# Patient Record
Sex: MALE | Race: ASIAN | URBAN - METROPOLITAN AREA
[De-identification: names, ages, dates, MRNs, and addresses within clinical notes are randomized per-mention and may not be internally consistent; named-entity substitution may affect disease eponyms.]

---

## 2018-03-12 ENCOUNTER — OFFICE VISIT (OUTPATIENT)
Dept: OPHTHALMOLOGY | Facility: CLINIC | Age: 58
End: 2018-03-12
Attending: OPHTHALMOLOGY
Payer: COMMERCIAL

## 2018-03-12 DIAGNOSIS — E11.9 DIABETES MELLITUS WITHOUT COMPLICATION (H): ICD-10-CM

## 2018-03-12 DIAGNOSIS — H25.13 NUCLEAR SENILE CATARACT OF BOTH EYES: ICD-10-CM

## 2018-03-12 DIAGNOSIS — H40.053 BORDERLINE GLAUCOMA OF BOTH EYES WITH OCULAR HYPERTENSION: ICD-10-CM

## 2018-03-12 DIAGNOSIS — H11.009 PTERYGIUM: Primary | ICD-10-CM

## 2018-03-12 PROCEDURE — 92025 CPTRIZED CORNEAL TOPOGRAPHY: CPT | Mod: ZF | Performed by: OPHTHALMOLOGY

## 2018-03-12 PROCEDURE — G0463 HOSPITAL OUTPT CLINIC VISIT: HCPCS | Mod: 25

## 2018-03-12 PROCEDURE — 92133 CPTRZD OPH DX IMG PST SGM ON: CPT | Mod: ZF | Performed by: OPHTHALMOLOGY

## 2018-03-12 RX ORDER — TIMOLOL MALEATE 5 MG/ML
1 SOLUTION/ DROPS OPHTHALMIC DAILY
Qty: 1 BOTTLE | Refills: 11 | Status: SHIPPED | OUTPATIENT
Start: 2018-03-12 | End: 2019-08-28

## 2018-03-12 ASSESSMENT — TONOMETRY
OD_IOP_MMHG: 18
OS_IOP_MMHG: 27
IOP_METHOD: TONOPEN
OS_IOP_MMHG: 25
OD_IOP_MMHG: 22
IOP_METHOD: TONOPEN

## 2018-03-12 ASSESSMENT — PACHYMETRY
OS_CT(UM): 550
EXAM_DATE: 3/12/2018
OD_CT(UM): 540

## 2018-03-12 ASSESSMENT — CUP TO DISC RATIO
OS_RATIO: 0.5
OD_RATIO: 0.25

## 2018-03-12 ASSESSMENT — VISUAL ACUITY
OD_PH_SC: 20/30
OS_SC: 20/30
OD_PH_CC: -3
OD_SC: 20/50
METHOD: SNELLEN - LINEAR
OD_SC+: -2

## 2018-03-12 ASSESSMENT — EXTERNAL EXAM - LEFT EYE: OS_EXAM: NORMAL

## 2018-03-12 ASSESSMENT — CONF VISUAL FIELD
OS_NORMAL: 1
METHOD: COUNTING FINGERS
OD_NORMAL: 1

## 2018-03-12 ASSESSMENT — EXTERNAL EXAM - RIGHT EYE: OD_EXAM: NORMAL

## 2018-03-12 ASSESSMENT — SLIT LAMP EXAM - LIDS
COMMENTS: BLEPHARITIS
COMMENTS: BLEPHARITIS

## 2018-03-12 NOTE — MR AVS SNAPSHOT
After Visit Summary   3/12/2018    Felix Ge    MRN: [id number]           Patient Information     Date Of Birth          1960        Visit Information        Provider Department      3/12/2018 7:30 AM G[facility]        Today's Diagnoses     Pterygium    -  1    Nuclear senile cataract of both eyes        Borderline glaucoma of both eyes with ocular hypertension        Diabetes mellitus without complication (H)           Follow-ups after your visit        Future tests that were ordered for you today     Open Future Orders        Priority Expected Expires Ordered    Pachymetry OU (both eyes) Routine  2019 3/12/2018            Who to contact     Please call your clinic at [phone] to:    Ask questions about your health    Make or cancel appointments    Discuss your medicines    Learn about your test results    Speak to your doctor            Additional Information About Your Visit        MyChart Information     [employer] is an electronic gateway that provides easy, online access to your medical records. With [employer], you can request a clinic appointment, read your test results, renew a prescription or communicate with your care team.     To sign up for [employer] visit the website at [email or web address]   You will be asked to enter the access code listed below, as well as some personal information. Please follow the directions to create your username and password.     Your access code is: [email or web address]  Expires: 2018  7:30 AM     Your access code will  in 90 days. If you need help or a new code, please contact your [facility] or call [phone] for assistance.        Care EveryWhere ID     This is your Care EveryWhere ID. This could be used by other organizations to access your [facility] medical records  [id number]         Blood Pressure from Last 3 Encounters:   No data found for BP    Weight from Last 3 Encounters:    No data found for Wt              We Performed the Following     Corneal Topography OU (both eyes)     OCT Optic Nerve RNFL Spectralis OU (both eyes)          Today's Medication Changes          These changes are accurate as of 3/12/18  9:01 AM.  If you have any questions, ask your nurse or doctor.               Start taking these medicines.        Dose/Directions    timolol 0.5 % ophthalmic solution   Commonly known as:  TIMOPTIC   Used for:  Borderline glaucoma of both eyes with ocular hypertension   Started by:  Guru William MD        Dose:  1 drop   Place 1 drop Into the left eye daily   Quantity:  1 Bottle   Refills:  11            Where to get your medicines      These medications were sent to [facility]/pharmacy #[id number] - [place], [place] - [address]  [address], [place] [address]     Phone:  [phone]     timolol 0.5 % ophthalmic solution                Primary Care Provider    None Specified       No primary provider on file.        Equal Access to Services     [facility] : Luna Weiner, tayler crowley, sam finleyalyolanda bernardo, remington mathias . So [facility] [phone].    ATENCIÓN: Si habla español, tiene a egan disposición servicios gratuitos de asistencia lingüística. Llame al [phone].    We comply with applicable federal civil rights laws and [place] laws. We do not discriminate on the basis of race, color, national origin, age, disability, sex, sexual orientation, or gender identity.            Thank you!     Thank you for choosing EYE CLINIC  for your care. Our goal is always to provide you with excellent care. Hearing back from our patients is one way we can continue to improve our services. Please take a few minutes to complete the written survey that you may receive in the mail after your visit with us. Thank you!             Your Updated Medication List - Protect others around you: Learn how to  safely use, store and throw away your medicines at www.disposemymeds.org.          This list is accurate as of 3/12/18  9:01 AM.  Always use your most recent med list.                   Brand Name Dispense Instructions for use Diagnosis    metFORMIN 500 MG tablet    GLUCOPHAGE     Take 500 mg by mouth 2 times daily (with meals)        timolol 0.5 % ophthalmic solution    TIMOPTIC    1 Bottle    Place 1 drop Into the left eye daily    Borderline glaucoma of both eyes with ocular hypertension

## 2018-03-12 NOTE — NURSING NOTE
Chief Complaints and History of Present Illnesses   Patient presents with     Pterygium Evaluation     Both eyes     HPI    Affected eye(s):  Both   Symptoms:     No floaters   No flashes   No redness   No Dryness   No itching         Do you have eye pain now?:  No      Comments:  Pt here for Pterygium eval both eyes. Pt states that he has had pterygiums in both eyes for many years. No changes in appearance, but pt would like them checked again.  DM2 BS: Pt doesn't check sugars daily.  A1C: 6.7 taken 2-3 months ago per pt.  No results found for: A1C    Sharon Rahman [facility] March 12, 2018 7:34 AM

## 2018-03-12 NOTE — PROGRESS NOTES
CC: referred by Dr. Bassett for pterygium OU    HPI: 58yo  M, smoker, reports 30 year history of white spots at nasal cornea both eyes.  He grew up in [place] and was told many years ago to leave them until they affected vision.  He does not report redness, irritation, burning, or pain.  His vision is slightly more blurry both eyes, wearing only readers.  He denies new flashes, floaters, or curtains in vision. He is interested in having them removed because they seem to be growing. He has a son with glaucoma.     A1c 6.7    OcHx:  Pterygia both eyes x 30 years    Gtts: none    1. Pterygia OD > OS  - Causing irregular astigmatism  - Patient has noticed growth  - Would recommend excision OD with conjunctival autograft OD  - Risks/benefits/alternatives discussed  - Patient wishes to proceed with surgery    2. DMII  - No retinopathy  - Tight BG control, BP control, healthy diet, exercise    3. Cataracts both eyes  - Not visually significant  - Observe    4. Blepharitis  - Lid hygiene    5. Ocular hypertension  - anomalous, tilted nerve  - son with glaucoma  - check pachy OU - shows average K thickness  - check baseline RNFL OU - shows inferior thinning OS  - will check baseline HVF after surgery  - start timolol daily qAM      Chino Morfin, DO  Cornea Fellow    ~~~~~~~~~~~~~~~~~~~~~~~~~~~~~~~~~~~~~~~~~~~~~~~~~~~~~~~~~~~~~~~~    Complete documentation of historical and exam elements from today's encounter can be found in the full encounter summary report (not reduplicated in this progress note). I personally obtained the chief complaint(s) and history of present illness.  I confirmed and edited as necessary the review of systems, past medical/surgical history, family history, social history, and examination findings as documented by others; and I examined the patient myself. I personally reviewed the relevant tests, images, and reports as documented above. I formulated and edited as necessary the assessment and  plan and discussed the findings and management plan with the patient and family.    I personally viewed the [imaging] and I agree with the interpretation as documented by the resident/fellow and edited by me as appropriate.    Guru William MD        --------------------------------------------------------------------------------------------------------------------------------------------  Pachymetry - Interpretation & Report  Indication: glaucoma suspect OU  Performed by: Alejandra aBr  Reliability: good  Patient cooperation: good  Findings:   Right eye:  540 micrometers centrally    Left eye:  550 micrometers centrally   Interval Change, Assessment, & Impact on treatment:   Right eye:  Baseline, average pachy   Left eye:  Baseline, slightly thicker pachy   Signed: Guru William 3/12/2018 8:47 AM

## 2018-03-12 NOTE — LETTER
3/12/2018      RE: Felix Ge  [address]  [place] [address]       CC: referred by Dr. Bassett for pterygium OU    HPI: 58yo  M, smoker, reports 30 year history of white spots at nasal cornea both eyes.  He grew up [place] and was told many years ago to leave them until they affected vision.  He does not report redness, irritation, burning, or pain.  His vision is slightly more blurry both eyes, wearing only readers.  He denies new flashes, floaters, or curtains in vision. He is interested in having them removed because they seem to be growing. He has a son with glaucoma.     A1c 6.7    OcHx:  Pterygia both eyes x 30 years    Gtts: none    1. Pterygia OD > OS  - Causing irregular astigmatism  - Patient has noticed growth  - Would recommend excision OD with conjunctival autograft OD  - Risks/benefits/alternatives discussed  - Patient wishes to proceed with surgery    2. DMII  - No retinopathy  - Tight BG control, BP control, healthy diet, exercise    3. Cataracts both eyes  - Not visually significant  - Observe    4. Blepharitis  - Lid hygiene    5. Ocular hypertension  - anomalous, tilted nerve  - son with glaucoma  - check pachy OU - shows average K thickness  - check baseline RNFL OU - shows inferior thinning OS  - will check baseline HVF after surgery  - start timolol daily qAM      Chino Morfin, DO  Cornea Fellow    ~~~~~~~~~~~~~~~~~~~~~~~~~~~~~~~~~~~~~~~~~~~~~~~~~~~~~~~~~~~~~~~~    Complete documentation of historical and exam elements from today's encounter can be found in the full encounter summary report (not reduplicated in this progress note). I personally obtained the chief complaint(s) and history of present illness.  I confirmed and edited as necessary the review of systems, past medical/surgical history, family history, social history, and examination findings as documented by others; and I examined the patient myself. I personally reviewed the relevant tests, images, and  reports as documented above. I formulated and edited as necessary the assessment and plan and discussed the findings and management plan with the patient and family.    I personally viewed the [imaging] and I agree with the interpretation as documented by the resident/fellow and edited by me as appropriate.    Guru William MD        --------------------------------------------------------------------------------------------------------------------------------------------  Pachymetry - Interpretation & Report  Indication: glaucoma suspect OU  Performed by: Alejandra Bar  Reliability: good  Patient cooperation: good  Findings:   Right eye:  540 micrometers centrally    Left eye:  550 micrometers centrally   Interval Change, Assessment, & Impact on treatment:   Right eye:  Baseline, average pachy   Left eye:  Baseline, slightly thicker pachy   Signed: Guru William 3/12/2018 8:47 AM          Guru William MD

## 2018-03-12 NOTE — LETTER
3/12/2018       RE: Felix Ge  [address]  [place] [address]     Dear Colleague,    Thank you for referring your patient, Felix Ge, to the EYE CLINIC at [facility]. Please see a copy of my visit note below.    CC: referred by Dr. Bassett for pterygium OU    HPI: 58yo  M, smoker, reports 30 year history of white spots at nasal cornea both eyes.  He grew up [place] and was told many years ago to leave them until they affected vision.  He does not report redness, irritation, burning, or pain.  His vision is slightly more blurry both eyes, wearing only readers.  He denies new flashes, floaters, or curtains in vision. He is interested in having them removed because they seem to be growing. He has a son with glaucoma.     A1c 6.7    OcHx:  Pterygia both eyes x 30 years    Gtts: none    1. Pterygia OD > OS  - Causing irregular astigmatism  - Patient has noticed growth  - Would recommend excision OD with conjunctival autograft OD  - Risks/benefits/alternatives discussed  - Patient wishes to proceed with surgery    2. DMII  - No retinopathy  - Tight BG control, BP control, healthy diet, exercise    3. Cataracts both eyes  - Not visually significant  - Observe    4. Blepharitis  - Lid hygiene    5. Ocular hypertension  - anomalous, tilted nerve  - son with glaucoma  - check pachy OU  - check baseline RNFL OU  - will check baseline HVF after surgery    ~~~~~~~~~~~~~~~~~~~~~~~~~~~~~~~~~~~~~~~~~~~~~~~~~~~~~~~~~~~~~~~~  --------------------------------------------------------------------------------------------------------------------------------------------  Pachymetry - Interpretation & Report  Indication: ***  Performed by: ***  Reliability: good  Patient cooperation: good  Findings:   Right eye:  *** micrometers centrally (*** standard deviation)   Left eye:  *** micrometers centrally (*** standard deviation)  Interval Change, Assessment, & Impact on  treatment:   Right eye:  ***   Left eye:  ***   Signed: Guru William 3/12/2018 8:47 AM          Again, thank you for allowing me to participate in the care of your patient.      Sincerely,    Guru William MD

## 2019-06-04 DIAGNOSIS — H11.009 PTERYGIUM, UNSPECIFIED LATERALITY: Primary | ICD-10-CM

## 2019-06-05 ENCOUNTER — OFFICE VISIT (OUTPATIENT)
Dept: OPHTHALMOLOGY | Facility: CLINIC | Age: 59
End: 2019-06-05
Attending: OPHTHALMOLOGY
Payer: COMMERCIAL

## 2019-06-05 ENCOUNTER — TELEPHONE (OUTPATIENT)
Dept: OPHTHALMOLOGY | Facility: CLINIC | Age: 59
End: 2019-06-05

## 2019-06-05 DIAGNOSIS — H11.009 PTERYGIUM, UNSPECIFIED LATERALITY: ICD-10-CM

## 2019-06-05 DIAGNOSIS — H40.053 BORDERLINE GLAUCOMA OF BOTH EYES WITH OCULAR HYPERTENSION: Primary | ICD-10-CM

## 2019-06-05 PROCEDURE — 92025 CPTRIZED CORNEAL TOPOGRAPHY: CPT | Mod: ZF | Performed by: OPHTHALMOLOGY

## 2019-06-05 PROCEDURE — G0463 HOSPITAL OUTPT CLINIC VISIT: HCPCS | Mod: 25

## 2019-06-05 RX ORDER — TIMOLOL MALEATE 5 MG/ML
1 SOLUTION/ DROPS OPHTHALMIC DAILY
Qty: 5 ML | Refills: 11 | Status: SHIPPED | OUTPATIENT
Start: 2019-06-05 | End: 2021-11-29

## 2019-06-05 ASSESSMENT — CUP TO DISC RATIO
OS_RATIO: 0.5
OD_RATIO: 0.25

## 2019-06-05 ASSESSMENT — TONOMETRY
IOP_METHOD: TONOPEN
OD_IOP_MMHG: 22
OS_IOP_MMHG: 33

## 2019-06-05 ASSESSMENT — VISUAL ACUITY
OS_PH_SC: 20/25
METHOD: SNELLEN - LINEAR
OD_PH_SC: 20/20
OS_SC: 20/40
OD_SC: 20/40

## 2019-06-05 ASSESSMENT — CONF VISUAL FIELD
OS_NORMAL: 1
OD_NORMAL: 1

## 2019-06-05 ASSESSMENT — EXTERNAL EXAM - LEFT EYE: OS_EXAM: NORMAL

## 2019-06-05 ASSESSMENT — EXTERNAL EXAM - RIGHT EYE: OD_EXAM: NORMAL

## 2019-06-05 ASSESSMENT — SLIT LAMP EXAM - LIDS
COMMENTS: BLEPHARITIS
COMMENTS: BLEPHARITIS

## 2019-06-05 NOTE — TELEPHONE ENCOUNTER
Patient is scheduled for surgery with Dr. William      Spoke or left message with: patient    Date of Surgery: 7/30/19    [place] [facility]    Informed patient they will need an adult  Yes    Pre-op with surgeon (if applicable): JUNG    H&P: Scheduled with Dr. Lidia COLBERT Baldwin patient will call and schedule appointment.    Additional imaging/appointments: post op appointments scheduled.    Surgery packet: gave to patient 6/5/19    Additional comments: Advised RN will call 1 - 3 days prior with arrival time and instructions.

## 2019-06-05 NOTE — PROGRESS NOTES
CC: referred by Dr. Bassett for pterygium OU    HPI: 57yo  M, smoker, reports 30 year history of white spots at nasal cornea both eyes.  He grew up [place] and was told many years ago to leave them until they affected vision. Patient was previously seen and wanted to scheduled surgery for pterygium, but was not called back for scheduling.     Interval Hx: He does not report redness, irritation, burning, or pain.  His vision is slightly more blurry both eyes, wearing only readers.  He denies new flashes, floaters, or curtains in vision. He is interested in having them removed because they seem to be growing. He has a son with glaucoma.       OcHx:  Pterygia both eyes x 30 years    Gtts: none    1. Pterygia OD > OS  - Causing irregular astigmatism  - Patient has noticed growth  - Would recommend excision OD with conjunctival autograft OD  - Risks/benefits/alternatives discussed  - Patient wishes to proceed with surgery  - K lupe today with persistent irregular astigmatism - would defer combined pterygium/cataract surgery.    2. DMII  - No retinopathy  - Tight BG control, BP control, healthy diet, exercise    3. Cataracts both eyes  - Borderline  - discussed possible combined pterygium/CE/IOL OD - will proceed with pterygium excision only    4. Blepharitis  - Lid hygiene    5. Ocular hypertension  - anomalous, tilted nerve  - son with glaucoma  - check pachy OU - shows average K thickness  - will check baseline HVF after surgery  - start timolol daily qAM each eye - stressed compliance    Attending Physician Attestation:  Complete documentation of historical and exam elements from today's encounter can be found in the full encounter summary report (not reduplicated in this progress note).  I personally obtained the chief complaint(s) and history of present illness.  I confirmed and edited as necessary the review of systems, past medical/surgical history, family history, social history, and examination findings  as documented by others; and I examined the patient myself.  I personally reviewed the relevant tests, images, and reports as documented above.  I formulated and edited as necessary the assessment and plan and discussed the findings and management plan with the patient and family. - Guru William MD

## 2019-07-26 ENCOUNTER — TELEPHONE (OUTPATIENT)
Dept: OPHTHALMOLOGY | Facility: CLINIC | Age: 59
End: 2019-07-26

## 2019-07-26 NOTE — TELEPHONE ENCOUNTER
Returned patient's call regarding  procedure with Dr. William on 7/30/19.      Advised RN tried to call yesterday and left voicemail. Advised RN will call back with arrival time and instructions.      Advised to call me with my direct line [phone] if any other questions.

## 2019-07-26 NOTE — TELEPHONE ENCOUNTER
[facility]    Phone Message    May a detailed message be left on voicemail: yes    Reason for Call: Other: Felix would like to hear from someone today if possible regarding his procedure instructions for surgery with Dr. William on Tuesday 7.30.19.      Action Taken: Message routed to:  [facility] ([facility] ump eye

## 2019-07-29 ENCOUNTER — ANESTHESIA EVENT (OUTPATIENT)
Dept: SURGERY | Facility: AMBULATORY SURGERY CENTER | Age: 59
End: 2019-07-29

## 2019-07-30 ENCOUNTER — HOSPITAL ENCOUNTER (OUTPATIENT)
Facility: AMBULATORY SURGERY CENTER | Age: 59
End: 2019-07-30
Attending: OPHTHALMOLOGY
Payer: COMMERCIAL

## 2019-07-30 ENCOUNTER — ANESTHESIA (OUTPATIENT)
Dept: SURGERY | Facility: AMBULATORY SURGERY CENTER | Age: 59
End: 2019-07-30

## 2019-07-30 VITALS
BODY MASS INDEX: 21.97 KG/M2 | DIASTOLIC BLOOD PRESSURE: 77 MMHG | TEMPERATURE: 98.1 F | HEIGHT: 67 IN | HEART RATE: 65 BPM | RESPIRATION RATE: 16 BRPM | SYSTOLIC BLOOD PRESSURE: 117 MMHG | WEIGHT: 140 LBS | OXYGEN SATURATION: 99 %

## 2019-07-30 DIAGNOSIS — H11.001 PTERYGIUM EYE, RIGHT: Primary | ICD-10-CM

## 2019-07-30 DIAGNOSIS — Z98.890 POSTOPERATIVE EYE STATE: ICD-10-CM

## 2019-07-30 LAB — GLUCOSE BLDC GLUCOMTR-MCNC: 123 MG/DL (ref 70–99)

## 2019-07-30 DEVICE — EYE IMP AMNIOTIC MEMBRANE 2.0X1.5CM AG-2015: Type: IMPLANTABLE DEVICE | Site: EYE | Status: FUNCTIONAL

## 2019-07-30 RX ORDER — ONDANSETRON 4 MG/1
4 TABLET, ORALLY DISINTEGRATING ORAL EVERY 30 MIN PRN
Status: DISCONTINUED | OUTPATIENT
Start: 2019-07-30 | End: 2019-07-31 | Stop reason: HOSPADM

## 2019-07-30 RX ORDER — EPINEPHRINE 1 MG/ML
INJECTION, SOLUTION, CONCENTRATE INTRAVENOUS PRN
Status: DISCONTINUED | OUTPATIENT
Start: 2019-07-30 | End: 2019-07-30 | Stop reason: HOSPADM

## 2019-07-30 RX ORDER — NALOXONE HYDROCHLORIDE 0.4 MG/ML
.1-.4 INJECTION, SOLUTION INTRAMUSCULAR; INTRAVENOUS; SUBCUTANEOUS
Status: DISCONTINUED | OUTPATIENT
Start: 2019-07-30 | End: 2019-07-31 | Stop reason: HOSPADM

## 2019-07-30 RX ORDER — LIDOCAINE HYDROCHLORIDE 20 MG/ML
INJECTION, SOLUTION INFILTRATION; PERINEURAL PRN
Status: DISCONTINUED | OUTPATIENT
Start: 2019-07-30 | End: 2019-07-30

## 2019-07-30 RX ORDER — FIBRINOGEN HUMAN, HUMAN THROMBIN 2 ML
KIT TOPICAL PRN
Status: DISCONTINUED | OUTPATIENT
Start: 2019-07-30 | End: 2019-07-30 | Stop reason: HOSPADM

## 2019-07-30 RX ORDER — LIDOCAINE 40 MG/G
CREAM TOPICAL
Status: DISCONTINUED | OUTPATIENT
Start: 2019-07-30 | End: 2019-07-30 | Stop reason: HOSPADM

## 2019-07-30 RX ORDER — PROPOFOL 10 MG/ML
INJECTION, EMULSION INTRAVENOUS PRN
Status: DISCONTINUED | OUTPATIENT
Start: 2019-07-30 | End: 2019-07-30

## 2019-07-30 RX ORDER — LIDOCAINE HYDROCHLORIDE AND EPINEPHRINE 10; 10 MG/ML; UG/ML
INJECTION, SOLUTION INFILTRATION; PERINEURAL PRN
Status: DISCONTINUED | OUTPATIENT
Start: 2019-07-30 | End: 2019-07-30 | Stop reason: HOSPADM

## 2019-07-30 RX ORDER — SODIUM CHLORIDE, SODIUM LACTATE, POTASSIUM CHLORIDE, CALCIUM CHLORIDE 600; 310; 30; 20 MG/100ML; MG/100ML; MG/100ML; MG/100ML
INJECTION, SOLUTION INTRAVENOUS CONTINUOUS
Status: DISCONTINUED | OUTPATIENT
Start: 2019-07-30 | End: 2019-07-31 | Stop reason: HOSPADM

## 2019-07-30 RX ORDER — ONDANSETRON 2 MG/ML
4 INJECTION INTRAMUSCULAR; INTRAVENOUS EVERY 30 MIN PRN
Status: DISCONTINUED | OUTPATIENT
Start: 2019-07-30 | End: 2019-07-31 | Stop reason: HOSPADM

## 2019-07-30 RX ORDER — SODIUM CHLORIDE, SODIUM LACTATE, POTASSIUM CHLORIDE, CALCIUM CHLORIDE 600; 310; 30; 20 MG/100ML; MG/100ML; MG/100ML; MG/100ML
INJECTION, SOLUTION INTRAVENOUS CONTINUOUS
Status: DISCONTINUED | OUTPATIENT
Start: 2019-07-30 | End: 2019-07-30 | Stop reason: HOSPADM

## 2019-07-30 RX ORDER — BALANCED SALT SOLUTION 6.4; .75; .48; .3; 3.9; 1.7 MG/ML; MG/ML; MG/ML; MG/ML; MG/ML; MG/ML
SOLUTION OPHTHALMIC PRN
Status: DISCONTINUED | OUTPATIENT
Start: 2019-07-30 | End: 2019-07-30 | Stop reason: HOSPADM

## 2019-07-30 RX ORDER — DEXAMETHASONE SODIUM PHOSPHATE 4 MG/ML
INJECTION, SOLUTION INTRA-ARTICULAR; INTRALESIONAL; INTRAMUSCULAR; INTRAVENOUS; SOFT TISSUE PRN
Status: DISCONTINUED | OUTPATIENT
Start: 2019-07-30 | End: 2019-07-30 | Stop reason: HOSPADM

## 2019-07-30 RX ORDER — OFLOXACIN 3 MG/ML
1 SOLUTION/ DROPS OPHTHALMIC 4 TIMES DAILY
Qty: 5 ML | Refills: 0 | Status: SHIPPED | OUTPATIENT
Start: 2019-07-30 | End: 2019-08-28

## 2019-07-30 RX ORDER — PREDNISOLONE ACETATE 10 MG/ML
1 SUSPENSION/ DROPS OPHTHALMIC 4 TIMES DAILY
Qty: 5 ML | Refills: 0 | Status: SHIPPED | OUTPATIENT
Start: 2019-07-30 | End: 2019-08-28

## 2019-07-30 RX ORDER — ACETAMINOPHEN 325 MG/1
975 TABLET ORAL ONCE
Status: COMPLETED | OUTPATIENT
Start: 2019-07-30 | End: 2019-07-30

## 2019-07-30 RX ORDER — MEPERIDINE HYDROCHLORIDE 25 MG/ML
12.5 INJECTION INTRAMUSCULAR; INTRAVENOUS; SUBCUTANEOUS
Status: DISCONTINUED | OUTPATIENT
Start: 2019-07-30 | End: 2019-07-31 | Stop reason: HOSPADM

## 2019-07-30 RX ORDER — MOXIFLOXACIN 5 MG/ML
1 SOLUTION/ DROPS OPHTHALMIC
Status: COMPLETED | OUTPATIENT
Start: 2019-07-30 | End: 2019-07-30

## 2019-07-30 RX ADMIN — LIDOCAINE HYDROCHLORIDE 40 MG: 20 INJECTION, SOLUTION INFILTRATION; PERINEURAL at 14:48

## 2019-07-30 RX ADMIN — PROPOFOL 50 MG: 10 INJECTION, EMULSION INTRAVENOUS at 14:49

## 2019-07-30 RX ADMIN — ACETAMINOPHEN 975 MG: 325 TABLET ORAL at 12:00

## 2019-07-30 RX ADMIN — MOXIFLOXACIN 1 DROP: 5 SOLUTION/ DROPS OPHTHALMIC at 12:05

## 2019-07-30 RX ADMIN — MOXIFLOXACIN 1 DROP: 5 SOLUTION/ DROPS OPHTHALMIC at 12:00

## 2019-07-30 RX ADMIN — SODIUM CHLORIDE, SODIUM LACTATE, POTASSIUM CHLORIDE, CALCIUM CHLORIDE: 600; 310; 30; 20 INJECTION, SOLUTION INTRAVENOUS at 14:44

## 2019-07-30 RX ADMIN — MOXIFLOXACIN 1 DROP: 5 SOLUTION/ DROPS OPHTHALMIC at 12:10

## 2019-07-30 ASSESSMENT — LIFESTYLE VARIABLES: TOBACCO_USE: 1

## 2019-07-30 ASSESSMENT — MIFFLIN-ST. JEOR: SCORE: 1403.67

## 2019-07-30 NOTE — ANESTHESIA POSTPROCEDURE EVALUATION
Anesthesia POST Procedure Evaluation    Patient: Felix Ge   MRN:     [id number] Gender:   male   Age:    59 year old :      1960        Preoperative Diagnosis: Pterygium, Right Eye   Procedure(s):  Right Eye Pterygium Excision with Conjunctival Autograft, Amniograft membrane   Postop Comments: No value filed.       Anesthesia Type:  Not documented  MAC    Reportable Event: NO     PAIN: Uncomplicated   Sign Out status: Comfortable, Well controlled pain     PONV: No PONV   Sign Out status:  No Nausea or Vomiting     Neuro/Psych: Uneventful perioperative course   Sign Out Status: Preoperative baseline; Age appropriate mentation     Airway/Resp.: Uneventful perioperative course   Sign Out Status: Non labored breathing, age appropriate RR; Resp. Status within EXPECTED Parameters     CV: Uneventful perioperative course   Sign Out status: Appropriate BP and perfusion indices; Appropriate HR/Rhythm     Disposition:   Sign Out in:  PACU  Disposition:  Phase II; Home  Recovery Course: Uneventful  Follow-Up: Not required           Last Anesthesia Record Vitals:  CRNA VITALS  2019 1514 - 2019 1614      2019             Resp Rate (observed):  16          Last PACU Vitals:  Vitals Value Taken Time   /83 2019  3:50 PM   Temp 36.7  C (98.1  F) 2019  3:50 PM   Pulse 70 2019  3:50 PM   Resp 16 2019  3:50 PM   SpO2 100 % 2019  3:50 PM   Temp src Available 2019  3:44 PM   NIBP 116/79 2019  3:40 PM   Pulse 67 2019  3:44 PM   SpO2 100 % 2019  3:44 PM   Resp     Temp     Ht Rate 66 2019  3:44 PM   Temp 2           Electronically Signed By: Johnie Blanc MD, MD, 2019, 4:42 PM

## 2019-07-30 NOTE — OP NOTE
Opthalmology Op Note    DATE OF OPERATION: 7/30/2019   PREOPERATIVE DIAGNOSIS: Pterygium, Left eye  POSTOPERATIVE DIAGNOSIS: Same  OPERATION PERFORMED:   1. Pterygium excision, left eye  2. Conjunctival autograft, left eye (1.2cm x 0.6cm)  3. Amniotic membrane transplantation, Left eye (1.5cm x 1.5cm)  ATTENDING: Guru William MD  FELLOW: Rao Aguillon DO  RESIDENT: Domenic Alfaro MD  FINDINGS: None  SPECIMEN: None  COMPLICATIONS: None  BLOOD LOSS: < 5.0 mL  Anesthesia: MAC/RBB    OPERATIVE INDICATIONS: The patient suffers from a pterygium of the Left eye.   After discussing the option of pterygium surgery including the risks and   benefits the patient voiced understanding and elected to proceed today.    DESCRIPTION OF PROCEDURE: The patient was identified in the preoperative   holding area where the operative eye was marked. The patient was then brought to the   operating room where a time out was called identifying the patient, the   procedure, and the correct site. A retrobulbar block consisting of Marcaine,   lidocaine, and Wydase was applied to the operative eye. Tetracaine drops were   applied to both eyes. The operative eye was prepped and draped in the usual sterile   ophthalmic fashion. An eyelid speculum was placed in the operative eye.     A surgical marker was used to dilineate the pterygium approximately 2.0 mm from the corneal limbus. Subconjunctival injection of lidocaine and phenylephrine was administered near the area of the pterygium.  Starting centrally, a 0.12 forceps was used to lift the pterygium off the cornea. Sharp Do scissors were used to trim the pterygium along the previous chase. The edges of the conjunctiva were underminded and approx 1-2 mm of underlying Tenon's was excised around the full border of the defect.   The bare scleral wound was again measured and surgical marker was used to delineate a superior conjunctival autograft. Subconjunctival injection of lidocaine and  phenylephrine was administered under the area of the autograft which was then bluntly dissected with sharp Do sissors and trimmed to the previous marked size. Tissue glue was used to adhere conjunctival autograft to the remaining defect between the conjunctiva and limbus nasally.   Cryopreserved amniotic membrane measuring 1.5x1.5 was cut and adhered with Tissel glue over the cornea, limbus and conjunctival autograft. Another piece of cryopreserved amniotic membrane measuring approximately 1 mm larger than the defect was cut to size and adhered with Tissel glue. Excess glue was removed from the edges of the amniotic membrane and kontour lens was placed.    Subconjunctival injections of dexamethasone and Ancef were given. The eyelid speculum was removed. Maxitrol ointment was placed into the eye.  A patch and Dennis shield were placed over the operative eye.   The patient tolerated the procedure well and was in stable condition on the way to the recovery room.     Dr. Guru William was scrubbed and present during the entire case.

## 2019-07-30 NOTE — ANESTHESIA PREPROCEDURE EVALUATION
Anesthesia Pre-Procedure Evaluation    Patient: Felix Ge   MRN:     [id number] Gender:   male   Age:    59 year old :      1960        Preoperative Diagnosis: Pterygium, Right Eye   Procedure(s):  Right Eye Pterygium Excision with Conjunctival Autograft     No past medical history on file.   History reviewed. No pertinent surgical history.       Anesthesia Evaluation     . Pt has had prior anesthetic.     No history of anesthetic complications          ROS/MED HX    ENT/Pulmonary:     (+)tobacco use, Current use , . .    Neurologic:  - neg neurologic ROS     Cardiovascular:     (+) Dyslipidemia, ----. : . . . :. .       METS/Exercise Tolerance:  >4 METS   Hematologic:  - neg hematologic  ROS       Musculoskeletal:  - neg musculoskeletal ROS       GI/Hepatic:  - neg GI/hepatic ROS       Renal/Genitourinary:         Endo:     (+) type II DM .      Psychiatric:  - neg psychiatric ROS       Infectious Disease:  - neg infectious disease ROS       Malignancy:      - no malignancy   Other:    - neg other ROS                     PHYSICAL EXAM:   Mental Status/Neuro: A/A/O   Airway: Facies: Feasible  Mallampati: II  Mouth/Opening: Full  TM distance: < 6 cm  Neck ROM: Full   Respiratory:   Resp. Rate: Normal     Resp. Effort: Normal      CV:    Comments:                      LABS:  CBC: No results found for: WBC, HGB, HCT, PLT  BMP: No results found for: NA, POTASSIUM, CHLORIDE, CO2, BUN, CR, GLC  COAGS: No results found for: PTT, INR, FIBR  POC:   Lab Results   Component Value Date     (H) 2019     OTHER: No results found for: PH, LACT, A1C, JENNIFER, PHOS, MAG, ALBUMIN, PROTTOTAL, ALT, AST, GGT, ALKPHOS, BILITOTAL, BILIDIRECT, LIPASE, AMYLASE, PERI, TSH, T4, T3, CRP, SED     Preop Vitals    BP Readings from Last 3 Encounters:   19 122/85    Pulse Readings from Last 3 Encounters:   19 75      Resp Readings from Last 3 Encounters:   19 16    SpO2 Readings from Last 3 Encounters:  "  07/30/19 98%      Temp Readings from Last 1 Encounters:   07/30/19 36.8  C (98.2  F) (Oral)    Ht Readings from Last 1 Encounters:   07/30/19 1.702 m (5' 7\")      Wt Readings from Last 1 Encounters:   07/30/19 63.5 kg (140 lb)    Estimated body mass index is 21.93 kg/m  as calculated from the following:    Height as of this encounter: 1.702 m (5' 7\").    Weight as of this encounter: 63.5 kg (140 lb).     LDA:  Peripheral IV 07/30/19 Right Hand (Active)   Site Assessment WDL 7/30/2019 12:05 PM   Line Status Infusing 7/30/2019 12:05 PM   Phlebitis Scale 0-->no symptoms 7/30/2019 12:05 PM   Infiltration Scale 0 7/30/2019 12:05 PM   Infiltration Site Treatment Method  None 7/30/2019 12:05 PM   Extravasation? No 7/30/2019 12:05 PM   Number of days: 0        Assessment:   ASA SCORE: 2    H&P: History and physical reviewed and following examination; no interval change.     Smoking Status:  Active Smoker       - patient smoked on day of surgery   NPO Status: NPO Appropriate     Plan:   Anes. Type:  MAC   Pre-Medication: None   Induction:  IV (Standard)   Airway: Native Airway   Access/Monitoring: PIV   Maintenance: Propofol Sedation     Postop Plan:   Postop Pain: Opioids  Postop Sedation/Airway: Not planned  Disposition: Outpatient     PONV Management:   Adult Risk Factors:, Postop Opioids   Prevention:, Propofol     CONSENT: Direct conversation   Plan and risks discussed with: Patient   Blood Products: Consent Deferred (Minimal Blood Loss)                   Pilo Benito MD  "

## 2019-07-30 NOTE — DISCHARGE INSTRUCTIONS
[facility]  Home Care Following Anesthesia  For 24 hours after surgery:  1. Get plenty of rest.  A responsible adult must stay with you for at least 24 hours after you leave the surgery center.  2. Do not drive or use heavy equipment.  If you have weakness or tingling, don't drive or use heavy equipment until this feeling goes away.   3. Do not drink alcohol.   4. Avoid strenuous or risky activities.  Ask for help when climbing stairs.  5. You may feel lightheaded.  IF so, sit for a few minutes before standing.  Have someone help you get up.   6. If you have nausea (feel sick to your stomach): Drink only clear liquids such as apple juice, ginger ale, broth or 7-Up.  Rest may also help.  Be sure to drink enough fluids.  Move to a regular diet as you feel able.   7. You may have a slight fever.  Call the doctor if your fever is over 100 F (37.7 C) (taken under the tongue) or lasts longer than 24 hours.  8. You may have a dry mouth, a sore throat, muscle aches or trouble sleeping. These should go away after 24 hours.  9. Do not make important or legal decisions.               Tips for taking pain medications  To get the best pain relief possible, remember these points:    Take pain medications as directed, before pain becomes severe.    Pain medication can upset your stomach: taking it with food may help.    Constipation is a common side effect of pain medication. Drink plenty of  fluids.    Eat foods high in fiber. Take a stool softener if recommended by your doctor or pharmacist.    Do not drink alcohol, drive or operate machinery while taking pain medications.    Ask about other ways to control pain, such as with heat, ice or relaxation.    Tylenol/Acetaminophen Consumption  To help encourage the safe use of acetaminophen, the makers of TYLENOL  have lowered the maximum daily dose for single-ingredient Extra Strength TYLENOL  (acetaminophen) products sold in the [place] from 8  pills per day (4,000 mg) to 6 pills per day (3,000 mg). The dosing interval has also changed from 2 pills every 4-6 hours to 2 pills every 6 hours.    If you feel your pain relief is insufficient, you may take Tylenol/Acetaminophen in addition to your narcotic pain medication.     Be careful not to exceed 3,000 mg of Tylenol/Acetaminophen in a 24 hour period from all sources.    If you are taking extra strength Tylenol/acetaminophen (500 mg), the maximum dose is 6 tablets in 24 hours.    If you are taking regular strength acetaminophen (325 mg), the maximum dose is 9 tablets in 24 hours.    Tylenol 975 mg was given at 12:00pm.  Next dose ok to take at at 6 pm, then follow bottle instructions.    Call a doctor for any of the followin. Signs of infection (fever, growing tenderness at the surgery site, a large amount of drainage or bleeding, severe pain, foul-smelling drainage, redness, swelling).  2. It has been over 8 to 10 hours since surgery and you are still not able to urinate (pass water).  3. Headache for over 24 hours.    Your doctor is:  Dr. Guru William, Ophthalmology: [phone]                   Or dial [phone] and ask for the resident on call for:  Ophthalmology  For emergency care, call the:  [facility]:  [phone] (TTY for hearing impaired: [phone])

## 2019-07-30 NOTE — ANESTHESIA CARE TRANSFER NOTE
Patient: Felix Ge    Procedure(s):  Right Eye Pterygium Excision with Conjunctival Autograft, Amniograft membrane    Diagnosis: Pterygium, Right Eye  Diagnosis Additional Information: No value filed.    Anesthesia Type:   MAC     Note:  Airway :Room Air  Patient transferred to:Phase II  Comments: Uneventful transport to Phase 2 on room air  Report to [facility] [place]  Exchanging well; color natl  Pt responds appropriately to command  IV patent  Lips/teeth/dentition as preop status  Questions answered  /83  HR 71  TAT 98.1  RR 16  Sat 99%Handoff Report: Identifed the Patient, Identified the Reponsible Provider, Reviewed the pertinent medical history, Discussed the surgical course, Reviewed Intra-OP anesthesia mangement and issues during anesthesia, Set expectations for post-procedure period and Allowed opportunity for questions and acknowledgement of understanding      Vitals: (Last set prior to Anesthesia Care Transfer)    [facility] VITALS  7/30/2019 1514 - 7/30/2019 1553      7/30/2019             Pulse:  67    Ht Rate:  66    SpO2:  100 %    EKG:  Sinus rhythm                Electronically Signed By: JASON DONNELLY CRNA  July 30, 2019  3:53 PM

## 2019-07-30 NOTE — BRIEF OP NOTE
[facility] Brief Operative Note    Pre-operative diagnosis: Pterygium, Right Eye   Post-operative diagnosis same   Procedure: Procedure(s):  Right Eye Pterygium Excision with Conjunctival Autograft   Surgeon(s): Surgeon(s) and Role:     * Guru William MD - Primary   Estimated blood loss: none   Specimens: none   Findings: As expected

## 2019-07-31 ENCOUNTER — OFFICE VISIT (OUTPATIENT)
Dept: OPHTHALMOLOGY | Facility: CLINIC | Age: 59
End: 2019-07-31
Attending: OPHTHALMOLOGY
Payer: COMMERCIAL

## 2019-07-31 DIAGNOSIS — H11.009 PTERYGIUM, UNSPECIFIED LATERALITY: Primary | ICD-10-CM

## 2019-07-31 PROBLEM — E78.00 HYPERCHOLESTEREMIA: Status: ACTIVE | Noted: 2019-07-31

## 2019-07-31 PROBLEM — F17.200 TOBACCO USE DISORDER: Status: ACTIVE | Noted: 2018-07-12

## 2019-07-31 PROBLEM — E11.9 DM (DIABETES MELLITUS) (H): Status: ACTIVE | Noted: 2019-07-31

## 2019-07-31 PROCEDURE — G0463 HOSPITAL OUTPT CLINIC VISIT: HCPCS | Mod: ZF

## 2019-07-31 ASSESSMENT — VISUAL ACUITY
OD_SC: 20/250
METHOD: SNELLEN - LINEAR

## 2019-07-31 ASSESSMENT — CUP TO DISC RATIO
OS_RATIO: 0.5
OD_RATIO: 0.25

## 2019-07-31 ASSESSMENT — EXTERNAL EXAM - LEFT EYE: OS_EXAM: NORMAL

## 2019-07-31 ASSESSMENT — SLIT LAMP EXAM - LIDS
COMMENTS: BLEPHARITIS
COMMENTS: BLEPHARITIS

## 2019-07-31 ASSESSMENT — TONOMETRY
IOP_METHOD: ICARE
OD_IOP_MMHG: 14

## 2019-07-31 ASSESSMENT — EXTERNAL EXAM - RIGHT EYE: OD_EXAM: NORMAL

## 2019-07-31 NOTE — PROGRESS NOTES
CC: referred by Dr. Bassett for pterygium OU    HPI: 57yo  M, smoker, reports 30 year history of white spots at nasal cornea both eyes.  He grew up in [place] and was told many years ago to leave them until they affected vision. Now s/p pterygium excision OD.    Interval Hx: POD#1 s/p pterygium excision with conjunctival autograft OD - doing well, no pain, vision is blurry. No new flashes, floaters,  Diplopia.    OcHx:  Pterygium s/p excision/conj autograft/AMT OD (7/30/19)    Gtts: none    1. Pterygia OD > OS s/p excision OD/conj autograft/AMT OD  - start PF QID OD  - start ofloxacin QID OD  - start preservative free artificial tears  - eye shield at night  - post op precautions reviewed    2. DMII  - No retinopathy  - Tight BG control, BP control, healthy diet, exercise    3. Cataracts both eyes  - Borderline  - discussed possible combined pterygium/CE/IOL OD - will proceed with pterygium excision only    4. Blepharitis  - Lid hygiene    5. Ocular hypertension  - anomalous, tilted nerve  - son with glaucoma  - check pachy OU - shows average K thickness  - will check baseline HVF after surgery  - start timolol daily qAM each eye - stressed compliance    Attending Physician Attestation:  Complete documentation of historical and exam elements from today's encounter can be found in the full encounter summary report (not reduplicated in this progress note).  I personally obtained the chief complaint(s) and history of present illness.  I confirmed and edited as necessary the review of systems, past medical/surgical history, family history, social history, and examination findings as documented by others; and I examined the patient myself.  I personally reviewed the relevant tests, images, and reports as documented above.  I formulated and edited as necessary the assessment and plan and discussed the findings and management plan with the patient and family. - Guru William MD

## 2019-07-31 NOTE — LETTER
7/31/2019       RE: Felix Ge  [address]  [place] [address]     To Whom It May Concern,    Felix Ge is a patient of the EYE CLINIC at [facility]. He underwent eye surgery on his right eye. He is cleared to return to work, but with the following restrictions due to blurred vision in his right eye and compromised depth perception.     1) No heavy lifting over 20 pounds.  2) Climbing high ladders.    Again, thank you for allowing me to participate in the care of your patient.      Sincerely,    Guru William MD

## 2019-08-07 ENCOUNTER — OFFICE VISIT (OUTPATIENT)
Dept: OPHTHALMOLOGY | Facility: CLINIC | Age: 59
End: 2019-08-07
Attending: OPHTHALMOLOGY
Payer: COMMERCIAL

## 2019-08-07 DIAGNOSIS — H11.009 PTERYGIUM, UNSPECIFIED LATERALITY: Primary | ICD-10-CM

## 2019-08-07 DIAGNOSIS — Z98.890 POSTOPERATIVE EYE STATE: ICD-10-CM

## 2019-08-07 PROCEDURE — G0463 HOSPITAL OUTPT CLINIC VISIT: HCPCS | Mod: ZF

## 2019-08-07 RX ORDER — PREDNISOLONE ACETATE 10 MG/ML
1 SUSPENSION/ DROPS OPHTHALMIC 4 TIMES DAILY
Qty: 1 BOTTLE | Refills: 1 | Status: SHIPPED | OUTPATIENT
Start: 2019-08-07 | End: 2020-01-28

## 2019-08-07 RX ORDER — OFLOXACIN 3 MG/ML
1 SOLUTION/ DROPS OPHTHALMIC 2 TIMES DAILY
Qty: 1 BOTTLE | Refills: 1 | Status: SHIPPED | OUTPATIENT
Start: 2019-08-07 | End: 2020-01-28

## 2019-08-07 ASSESSMENT — VISUAL ACUITY
OD_SC: 20/100
OS_SC: 20/40
OS_SC+: +2
METHOD: SNELLEN - LINEAR
OS_PH_SC: 20/30

## 2019-08-07 ASSESSMENT — TONOMETRY
OS_IOP_MMHG: 25
OD_IOP_MMHG: 18
IOP_METHOD: ICARE

## 2019-08-07 ASSESSMENT — SLIT LAMP EXAM - LIDS
COMMENTS: BLEPHARITIS
COMMENTS: BLEPHARITIS

## 2019-08-07 ASSESSMENT — CUP TO DISC RATIO
OD_RATIO: 0.25
OS_RATIO: 0.5

## 2019-08-07 ASSESSMENT — EXTERNAL EXAM - RIGHT EYE: OD_EXAM: NORMAL

## 2019-08-07 ASSESSMENT — EXTERNAL EXAM - LEFT EYE: OS_EXAM: NORMAL

## 2019-08-07 NOTE — PATIENT INSTRUCTIONS
Continue prednisolone acetate four times daily in the right eye for 1 week, then three times daily for one week, then 2 times daily for one week, then one time daily for one week, then stop    Continue ofloxacin twice daily in the right eye    Continue preservative free artificial tears  (refresh) as needed    Continue to wear eye shield at night  For one more week.    Follow up in 3 weeks.

## 2019-08-07 NOTE — PROGRESS NOTES
CC: referred by Dr. Bassett for pterygium OU    HPI: 59yo  M, smoker, reports 30 year history of white spots at nasal cornea both eyes.  He grew up in [place] and was told many years ago to leave them until they affected vision. Now s/p pterygium excision OD.    Interval Hx: POW#1 s/p pterygium excision with conjunctival autograft OD - doing well, no pain, vision is blurry improved over the last couple days. No new flashes, floaters, Diplopia.    OcHx:  Pterygium s/p excision/conj autograft/AMT OD (7/30/19)    Gtts:   Prednisolone acetate QID right eye  Ofloxacin QID right eye     1. Pterygia OD > OS s/p excision OD/conj autograft/AMT right eye (7/30/2019)  - kontour exchanged with BCL today as patient was feeling FBS when kontour removed today  - continue PF QID right eye x 1 week then taper one week until stop  - continue ofloxacin BID OD  - continue preservative free artificial tears  - continue eye shield at night for 1 more week  - post op precautions reviewed    2. DMII  - No retinopathy  - Tight BG control, BP control, healthy diet, exercise    3. Cataracts both eyes  - Borderline  - monitor    4. Blepharitis  - Lid hygiene    5. Ocular hypertension  - anomalous, tilted nerve  - son with glaucoma  - check pachy OU - shows average K thickness  - will check baseline HVF after surgery  - start timolol daily qAM each eye - stressed compliance    Follow up in 3 weeks    --  Rao Aguillon,   PGY 5, Cornea Fellow  Ophthalmology    Attending Physician Attestation:  Complete documentation of historical and exam elements from today's encounter can be found in the full encounter summary report (not reduplicated in this progress note).  I personally obtained the chief complaint(s) and history of present illness.  I confirmed and edited as necessary the review of systems, past medical/surgical history, family history, social history, and examination findings as documented by others; and I examined the patient  myself.  I personally reviewed the relevant tests, images, and reports as documented above.  I formulated and edited as necessary the assessment and plan and discussed the findings and management plan with the patient and family. - Guru William MD

## 2019-08-07 NOTE — NURSING NOTE
Chief Complaints and History of Present Illnesses   Patient presents with     Post Op (Ophthalmology) Right Eye     Chief Complaint(s) and History of Present Illness(es)     Post Op (Ophthalmology) Right Eye     Laterality: right eye    Onset: 1 week ago              Comments     s/p pterygium excision with conjunctival autograft right eye  Pt. States that VA is still blurry RE.  No pain BE.  Elida Gonzalez COT 8:08 AM August 7, 2019

## 2019-08-28 ENCOUNTER — TELEPHONE (OUTPATIENT)
Dept: OPHTHALMOLOGY | Facility: CLINIC | Age: 59
End: 2019-08-28

## 2019-08-28 ENCOUNTER — OFFICE VISIT (OUTPATIENT)
Dept: OPHTHALMOLOGY | Facility: CLINIC | Age: 59
End: 2019-08-28
Attending: OPHTHALMOLOGY
Payer: COMMERCIAL

## 2019-08-28 ENCOUNTER — HOSPITAL ENCOUNTER (OUTPATIENT)
Facility: AMBULATORY SURGERY CENTER | Age: 59
End: 2019-08-28
Attending: OPHTHALMOLOGY
Payer: COMMERCIAL

## 2019-08-28 DIAGNOSIS — H11.009 PTERYGIUM, UNSPECIFIED LATERALITY: Primary | ICD-10-CM

## 2019-08-28 DIAGNOSIS — H25.12 NUCLEAR SCLEROTIC CATARACT OF LEFT EYE: ICD-10-CM

## 2019-08-28 PROCEDURE — G0463 HOSPITAL OUTPT CLINIC VISIT: HCPCS | Mod: ZF

## 2019-08-28 ASSESSMENT — SLIT LAMP EXAM - LIDS
COMMENTS: BLEPHARITIS
COMMENTS: BLEPHARITIS

## 2019-08-28 ASSESSMENT — VISUAL ACUITY
OS_SC+: -2
OD_PH_SC+: -2
OD_PH_SC: 20/25
METHOD: SNELLEN - LINEAR
OS_SC: 20/40
OD_SC+: +2
OD_SC: 20/40

## 2019-08-28 ASSESSMENT — TONOMETRY
OD_IOP_MMHG: 15
OS_IOP_MMHG: 28
OS_IOP_MMHG: 31
IOP_METHOD: TONOPEN
OS_IOP_MMHG: 30
OD_IOP_MMHG: X
OD_IOP_MMHG: 18
IOP_METHOD: TONOPEN
IOP_METHOD: TONOPEN

## 2019-08-28 ASSESSMENT — EXTERNAL EXAM - LEFT EYE: OS_EXAM: NORMAL

## 2019-08-28 ASSESSMENT — CONF VISUAL FIELD: METHOD: COUNTING FINGERS

## 2019-08-28 ASSESSMENT — CUP TO DISC RATIO
OD_RATIO: 0.25
OS_RATIO: 0.5

## 2019-08-28 ASSESSMENT — EXTERNAL EXAM - RIGHT EYE: OD_EXAM: NORMAL

## 2019-08-28 NOTE — TELEPHONE ENCOUNTER
Patient is scheduled for surgery with Dr. iWlliam      Spoke or left message with: patient    Date of Surgery: 11/5/19    Location: [facility]    Informed patient they will need an adult  Yes    Pre-op with surgeon (if applicable): JUNG    H&P: Scheduled with [facility] patient will call and schedule appointment.    Additional imaging/appointments: scheduled post op appointments.    Surgery packet: gave to patient 8/27/19     Additional comments: Advised RN will call 1 - 3 days prior with arrival time and instructions.

## 2019-08-28 NOTE — PATIENT INSTRUCTIONS
STOP ofloxacin (tan top)    DECREASE Prednisolone acetate (pink top) to once daily in the right eye for one week, then STOP    INCREASE preservative free artificial tears to two to four times daily in both eyes.    INCREASE timolol (yellow top) twice daily in both eyes.

## 2019-08-28 NOTE — PROGRESS NOTES
CC: referred by Dr. Bassett for pterygium OU    HPI: 58yo  M, smoker, reports 30 year history of white spots at nasal cornea both eyes.  He grew up [place] and was told many years ago to leave them until they affected vision. Now s/p pterygium excision OD.    Interval Hx: POW#4 s/p pterygium excision with conjunctival autograft OD - doing well, no pain, vision is blurry improved over the last couple days. No new flashes, floaters, Diplopia.    OcHx:  Pterygium s/p excision/conj autograft/AMT OD (7/30/19)    Gtts:   Prednisolone acetate BID right eye (on a taper)  Ofloxacin BID right eye   Preservative free artificial tears on occasion right eye (sometimes BID)  Timolol QAM both eyes    1. Pterygia OD > OS s/p excision OD/conj autograft/AMT right eye (7/30/2019)   - BCL had fallen out prior to visit today, patient unaware   - continue PF QD right eye x 1 week then stop   - stop ofloxacin   - D/C BCL   - continue preservative free artificial tears QID OD   - patient would like to proceed with surgery for pterygium removal of the left eye.     2. DMII   - No retinopathy   - Tight BG control, BP control, healthy diet, exercise    3. Cataracts both eyes   - pt elects to have cataract surgery in the left eye with possible monovision.  No hx trauma. Pt desires to be out of glasses. Discussed the possibility of monovision, patient interested in trying with CTL trial. However if he cannot tolerate monovision, I am hesitant to consider premium IOLs due to ocular hypertension and FH glaucoma.   - monitor    4. Blepharitis   - Lid hygiene    5. Ocular hypertension   - anomalous, tilted nerve   - son with glaucoma   - check pachy OU - shows average K thickness   - increase timolol to twice daily each eye   - recommend eval with glaucoma clinic before schedule for cataract surgery to assess for ON damage.    Schedule pterygium removal with cataract surgery for monovision in the left eye once IOP controlled (recommend  glaucoma eval) and trial with monovision contact lenses are performed with optometry. If patient fails monovision trial or glaucoma eval significant for damage, standard IOLs will likely be recommended.     --  Rao Aguillon, DO  PGY 5, Cornea Fellow  Ophthalmology    Attending Physician Attestation:  Complete documentation of historical and exam elements from today's encounter can be found in the full encounter summary report (not reduplicated in this progress note).  I personally obtained the chief complaint(s) and history of present illness.  I confirmed and edited as necessary the review of systems, past medical/surgical history, family history, social history, and examination findings as documented by others; and I examined the patient myself.  I personally reviewed the relevant tests, images, and reports as documented above.  I formulated and edited as necessary the assessment and plan and discussed the findings and management plan with the patient and family. - Guru William MD

## 2019-08-28 NOTE — NURSING NOTE
Chief Complaints and History of Present Illnesses   Patient presents with     Post Op (Ophthalmology) Right Eye     Chief Complaint(s) and History of Present Illness(es)     Post Op (Ophthalmology) Right Eye     Laterality: right eye    Course: stable    Associated symptoms: Negative for redness and eye pain    Pain scale: 0/10              Chief Complaints and History of Present Illnesses   Patient presents with     Post Op (Ophthalmology) Right Eye     Chief Complaint(s) and History of Present Illness(es)     Post Op (Ophthalmology) Right Eye     Laterality: right eye    Course: stable    Associated symptoms: Negative for redness and eye pain    Pain scale: 0/10              Comments     POW#4 s/p pterygium excision with conjunctival autograft RE / 3 week return.  Pt denies any eye pain or other ocular discomfort.  Prednisolone BID to RE  Timolol daily to BE / LD @ 6:30 am today / states compliant.  Oflox BID to RE  EMBER Rodriguez COT 8:04 AM 08/28/2019

## 2019-09-05 ENCOUNTER — OFFICE VISIT (OUTPATIENT)
Dept: OPHTHALMOLOGY | Facility: CLINIC | Age: 59
End: 2019-09-05
Payer: COMMERCIAL

## 2019-09-05 DIAGNOSIS — H25.12 NUCLEAR SCLEROTIC CATARACT OF LEFT EYE: Primary | ICD-10-CM

## 2019-09-05 ASSESSMENT — REFRACTION_CURRENTRX
OD_BASECURVE: 8.6
OS_BASECURVE: 8.6
OD_BRAND: BIOFINITY
OS_DIAMETER: 14.0
OD_DIAMETER: 14.0
OD_SPHERE: +1.00
OS_SPHERE: +3.50
OS_BRAND: BIOFINITY

## 2019-09-05 ASSESSMENT — VISUAL ACUITY
OD_SC+: -2
OS_SC+: -2
OD_SC: 20/40
OS_SC: 20/40
METHOD: SNELLEN - LINEAR

## 2019-09-05 ASSESSMENT — TONOMETRY
OS_IOP_MMHG: 33
OD_IOP_MMHG: 16
IOP_METHOD: ICARE

## 2019-09-05 ASSESSMENT — REFRACTION_MANIFEST
OD_CYLINDER: +0.75
OS_SPHERE: +1.00
OD_AXIS: 035
OS_CYLINDER: SPHERE
METHOD_AUTOREFRACTION: 1
OD_SPHERE: +0.75

## 2019-09-05 ASSESSMENT — SLIT LAMP EXAM - LIDS
COMMENTS: BLEPHARITIS
COMMENTS: BLEPHARITIS

## 2019-09-05 ASSESSMENT — EXTERNAL EXAM - LEFT EYE: OS_EXAM: NORMAL

## 2019-09-05 ASSESSMENT — EXTERNAL EXAM - RIGHT EYE: OD_EXAM: NORMAL

## 2019-09-05 NOTE — NURSING NOTE
Chief Complaints and History of Present Illnesses   Patient presents with     Consult For     KENNY carreon     Chief Complaint(s) and History of Present Illness(es)     Consult For     Laterality: both eyes    Onset: weeks ago    Frequency: constantly    Course: stable    Associated symptoms: Negative for eye pain    Treatments tried: eye drops    Pain scale: 0/10    Comments: KENNY carreon              Comments     Patient states vision has been stable since last eye exam, both eyes. Denies eye pain or irritation. Using Timolol    Michelle Solitario COT 7:40 AM September 5, 2019

## 2019-09-05 NOTE — PROGRESS NOTES
History  HPI     Consult For     In both eyes.  This started weeks ago.  Occurring constantly.  Since onset it is stable.  Associated symptoms include Negative for eye pain.  Treatments tried include eye drops.  Pain was noted as 0/10. Additional comments: KENNY carreon              Comments     Patient states vision has been stable since last eye exam, both eyes. Denies eye pain or irritation. Using Timolol    Michelle Solitario COT 7:40 AM September 5, 2019               Last edited by Michelle Solitario on 9/5/2019  7:40 AM. (History)          Assessment/Plan  (H25.12) Nuclear sclerotic cataract of left eye  (primary encounter diagnosis)  Comment: Monovision trialed, right eye dominant, ya based on autorefraction  Plan:  Educated patient on findings. Patient tolerated monovision well, though blurred vision was a concern of his (I explained that this was secondary to the cataracts). Copy of chart note sent to Dr. Lind. Patient would be a successful monovision patient following cataract surgery.    Complete documentation of historical and exam elements from today's encounter can  be found in the full encounter summary report (not reduplicated in this progress  note). I personally obtained the chief complaint(s) and history of present illness. I  confirmed and edited as necessary the review of systems, past medical/surgical  history, family history, social history, and examination findings as documented by  others; and I examined the patient myself. I personally reviewed the relevant tests,  images, and reports as documented above. I formulated and edited as necessary the  assessment and plan and discussed the findings and management plan with the  patient and family.    Ryan Cleveland, OD, FAAO

## 2019-09-05 NOTE — Clinical Note
Mr. Ge tolerated monovision well. I explained that near may still be clearer with reading glasses, but he had improvement compared to uncorrected vision. He had no dizziness/nausea/asthenopia with monovision (right eye distance).

## 2019-10-25 ENCOUNTER — OFFICE VISIT (OUTPATIENT)
Dept: OPHTHALMOLOGY | Facility: CLINIC | Age: 59
End: 2019-10-25
Attending: OPHTHALMOLOGY
Payer: COMMERCIAL

## 2019-10-25 DIAGNOSIS — H40.053 BORDERLINE GLAUCOMA OF BOTH EYES WITH OCULAR HYPERTENSION: ICD-10-CM

## 2019-10-25 DIAGNOSIS — H40.1123 PRIMARY OPEN ANGLE GLAUCOMA (POAG) OF LEFT EYE, SEVERE STAGE: Primary | ICD-10-CM

## 2019-10-25 DIAGNOSIS — H25.13 NUCLEAR SENILE CATARACT OF BOTH EYES: ICD-10-CM

## 2019-10-25 PROBLEM — H25.12 NUCLEAR SCLEROTIC CATARACT OF LEFT EYE: Status: ACTIVE | Noted: 2019-10-25

## 2019-10-25 PROCEDURE — G0463 HOSPITAL OUTPT CLINIC VISIT: HCPCS | Mod: 25

## 2019-10-25 PROCEDURE — 92133 CPTRZD OPH DX IMG PST SGM ON: CPT | Mod: ZF | Performed by: OPHTHALMOLOGY

## 2019-10-25 PROCEDURE — 92083 EXTENDED VISUAL FIELD XM: CPT | Mod: ZF | Performed by: OPHTHALMOLOGY

## 2019-10-25 PROCEDURE — 92020 GONIOSCOPY: CPT | Mod: ZF | Performed by: OPHTHALMOLOGY

## 2019-10-25 RX ORDER — LATANOPROST 50 UG/ML
1 SOLUTION/ DROPS OPHTHALMIC AT BEDTIME
Qty: 1 BOTTLE | Refills: 11 | Status: SHIPPED | OUTPATIENT
Start: 2019-10-25 | End: 2021-11-29 | Stop reason: DRUGHIGH

## 2019-10-25 ASSESSMENT — GONIOSCOPY
OD_INFERIOR: 4
OD_TEMPORAL: 4
OD_NASAL: 4
OS_TEMPORAL: 4
OS_INFERIOR: 4
ADDITIONAL_COMMENTS: 2+ TMP OU
OS_NASAL: 4
OS_SUPERIOR: 4
OD_SUPERIOR: 4

## 2019-10-25 ASSESSMENT — TONOMETRY
IOP_METHOD: APPLANATION
OS_IOP_MMHG: 31
OD_IOP_MMHG: 15

## 2019-10-25 ASSESSMENT — CONF VISUAL FIELD: OS_SUPERIOR_NASAL_RESTRICTION: 3

## 2019-10-25 ASSESSMENT — VISUAL ACUITY
OD_PH_SC: 20/25
METHOD: SNELLEN - LINEAR
OS_SC: 20/40
OD_SC+: -2
OS_PH_SC: 20/30
OD_SC: 20/30
OD_PH_SC+: +2

## 2019-10-25 ASSESSMENT — SLIT LAMP EXAM - LIDS
COMMENTS: BLEPHARITIS
COMMENTS: BLEPHARITIS

## 2019-10-25 ASSESSMENT — EXTERNAL EXAM - RIGHT EYE: OD_EXAM: NORMAL

## 2019-10-25 ASSESSMENT — EXTERNAL EXAM - LEFT EYE: OS_EXAM: NORMAL

## 2019-10-25 ASSESSMENT — CUP TO DISC RATIO
OD_RATIO: 0.3
OS_RATIO: 0.7

## 2019-10-25 NOTE — PATIENT INSTRUCTIONS
Patient will continue on Timolol which is a yellow top drop in the morning in BOTH EYES and start Latanoprost which is a teal top drop at bedtime in the LEFT EYE ONLY.  Patient will hold on proceeding with surgery in the left eye until IOPs are under better control. Patient will return to clinic in 3-4 weeks with repeat IOP check and refraction.      This drop may cause lash growth and some darkening of the skin around the eye.  It is also possible in a patient with a freckled iris or lynne eyes, that the iris could darken to brown with time, something that is not common but not reversible.

## 2019-10-25 NOTE — NURSING NOTE
Chief Complaints and History of Present Illnesses   Patient presents with     Consult For     Glaucoma.       Chief Complaint(s) and History of Present Illness(es)     Consult For     Associated symptoms: Negative for eye pain, tearing, dryness, floaters and flashes    Comments: Glaucoma.                Comments     Pt states that vision is stable since last visit.  Pt compliant with drops.  Pt has no pain or other concerns at this time.    DM 2.  Pt does no remember last time they checked BS.  No results found for: A1C  Pt reports last A1C was 7.4 about 2-3 months ago.    EMBER Gusman October 25, 2019 7:57 AM

## 2019-10-25 NOTE — PROGRESS NOTES
1)POAG OS>>OD -- started on gtts in 2019 by Dr. William, originally from [facility], etiology of blurred vision OS -- K pachy: 540/550   Tmax: 22/33    HVF: OD:Full and OS:Dense superior arcuate with eaerly IN step      CDR:0.3/0.7     HRT/OCT: OD:RNFL WNL and OS:Mod RNFL thinning with marked prog from 2018->2019     FHX of Glc:Unknown, family is in [place] and did not have good access to care      Gonio: open      Intolerant to:      Asthma/COPD: No  Steroid Use:  No   Kidney Stones: No     Sulfa Allergy:No      IOP targets:  2)NS OU -- currently scheduled for surgery with Dr. William with possible monovision  3)H/O Pterygium  s/p excision OD/conj autograft/AMT OD (7/30/2019) --- following with Dr. William -- planning for OS on 11/5/2019  4)DM s     MD:recomend Phaco/Glc surgery prior to pterygium -- do not recommend Monovison or Multifocal IOL    MD:pt started to notice dark area with blurred vision OS in 2019     Patient will continue on Timolol which is a yellow top drop in the morning in BOTH EYES and start Latanoprost which is a teal top drop at bedtime in the LEFT EYE ONLY.  Patient will hold on proceeding with surgery in the left eye until IOPs are under better control. Patient will return to clinic in 3-4 weeks with repeat IOP check and refraction.      This drop may cause lash growth and some darkening of the skin around the eye.  It is also possible in a patient with a freckled iris or lynne eyes, that the iris could darken to brown with time, something that is not common but not reversible.    Attending Physician Attestation:  Complete documentation of historical and exam elements from today's encounter can be found in the full encounter summary report (not reduplicated in this progress note). I personally obtained the chief complaint(s) and history of present illness.  I confirmed and edited as necessary the review of systems, past medical/surgical history, family history, social history, and examination findings as  documented by others; and I examined the patient myself. I personally reviewed the relevant tests, images, and reports as documented above. I formulated and edited as necessary the assessment and plan and discussed the findings and management plan with the patient and family.  - Sima Carpenter MD

## 2019-11-19 ENCOUNTER — OFFICE VISIT (OUTPATIENT)
Dept: OPHTHALMOLOGY | Facility: CLINIC | Age: 59
End: 2019-11-19
Attending: OPHTHALMOLOGY
Payer: COMMERCIAL

## 2019-11-19 DIAGNOSIS — H40.1123 PRIMARY OPEN ANGLE GLAUCOMA (POAG) OF LEFT EYE, SEVERE STAGE: Primary | ICD-10-CM

## 2019-11-19 DIAGNOSIS — H25.12 NUCLEAR SCLEROTIC CATARACT OF LEFT EYE: ICD-10-CM

## 2019-11-19 PROCEDURE — 92015 DETERMINE REFRACTIVE STATE: CPT | Mod: ZF

## 2019-11-19 PROCEDURE — G0463 HOSPITAL OUTPT CLINIC VISIT: HCPCS | Mod: ZF

## 2019-11-19 ASSESSMENT — TONOMETRY
OD_IOP_MMHG: 14
OS_IOP_MMHG: 21
OS_IOP_MMHG: 17
OD_IOP_MMHG: 18
IOP_METHOD: TONOPEN
IOP_METHOD: APPLANATION

## 2019-11-19 ASSESSMENT — REFRACTION_MANIFEST
OD_AXIS: 045
OS_AXIS: 030
OD_SPHERE: +1.00
OD_CYLINDER: +0.75
OD_ADD: +1.75
OS_ADD: +1.75
OS_CYLINDER: +1.00
OS_SPHERE: +0.50

## 2019-11-19 ASSESSMENT — VISUAL ACUITY
OD_PH_SC: 20/25
OS_PH_SC: 20/40
OD_PH_SC+: -1
OS_PH_SC+: -2
OD_SC+: -2
METHOD: SNELLEN - LINEAR
OD_SC: 20/30
OS_SC: 20/50

## 2019-11-19 ASSESSMENT — EXTERNAL EXAM - RIGHT EYE: OD_EXAM: NORMAL

## 2019-11-19 ASSESSMENT — SLIT LAMP EXAM - LIDS
COMMENTS: BLEPHARITIS
COMMENTS: BLEPHARITIS

## 2019-11-19 ASSESSMENT — CONF VISUAL FIELD: OS_SUPERIOR_NASAL_RESTRICTION: 3

## 2019-11-19 ASSESSMENT — EXTERNAL EXAM - LEFT EYE: OS_EXAM: NORMAL

## 2019-11-19 NOTE — PATIENT INSTRUCTIONS
Patient will continue on Timolol which is a yellow top drop in the morning in BOTH EYES and Latanoprost which is a teal top drop at bedtime in the LEFT EYE ONLY.  Patient will hold on proceeding with surgery in the left eye until IOPs are under better control. Patient will return to clinic in 2-3 months with repeat IOP check and visual field test (LEFT EYE ONLY).      This drop may cause lash growth and some darkening of the skin around the eye.  It is also possible in a patient with a freckled iris or lynne eyes, that the iris could darken to brown with time, something that is not common but not reversible.

## 2019-11-19 NOTE — NURSING NOTE
Chief Complaints and History of Present Illnesses   Patient presents with     Follow Up     Primary open angle glaucoma (POAG) of left eye, severe stage      Chief Complaint(s) and History of Present Illness(es)     Follow Up     Associated symptoms: Negative for eye pain, dryness, tearing, floaters and flashes    Comments: Primary open angle glaucoma (POAG) of left eye, severe stage               Comments     Pt states vision is the same since last visit.  Pt has no pain or other concerns at this time.    DM 2.  Pt does not check BS.  No results found for: A1C  Pt reports last A1C was 7.4 about 3-4 months ago.    EMBER Gusman November 19, 2019 7:48 AM

## 2019-11-19 NOTE — PROGRESS NOTES
1)POAG OS>>OD -- started on gtts in 2019 by Dr. William, originally from [facility], etiology of blurred vision OS -- K pachy: 540/550   Tmax: 22/33    HVF: OD:Full and OS:Dense superior arcuate with eaerly IN step      CDR:0.3/0.7     HRT/OCT: OD:RNFL WNL and OS:Mod RNFL thinning with marked prog from 2018->2019     FHX of Glc:Unknown, family is in [place] and did not have good access to care      Gonio: open      Intolerant to:      Asthma/COPD: No  Steroid Use:  No   Kidney Stones: No     Sulfa Allergy:No      IOP targets:OD:Hteens-L20s and OS:?Mteens  2)NS OU -- currently scheduled for surgery with Dr. William with possible monovision  3)H/O Pterygium  s/p excision OD/conj autograft/AMT OD (7/30/2019) --- following with Dr. William -- planning for OS on 11/5/2019  4)DM s DR HERNANDES:recomend Phaco/Glc surgery prior to pterygium -- do not recommend Monovison or Multifocal IOL    MD:pt started to notice dark area with blurred vision OS in 2019     Patient will continue on Timolol which is a yellow top drop in the morning in BOTH EYES and Latanoprost which is a teal top drop at bedtime in the LEFT EYE ONLY.  Patient will hold on proceeding with surgery in the left eye until IOPs are under better control. Patient will return to clinic in 2-3 months with repeat IOP check and visual field test (LEFT EYE ONLY).      This drop may cause lash growth and some darkening of the skin around the eye.  It is also possible in a patient with a freckled iris or lynne eyes, that the iris could darken to brown with time, something that is not common but not reversible.    Attending Physician Attestation:  Complete documentation of historical and exam elements from today's encounter can be found in the full encounter summary report (not reduplicated in this progress note). I personally obtained the chief complaint(s) and history of present illness.  I confirmed and edited as necessary the review of systems, past medical/surgical history, family  history, social history, and examination findings as documented by others; and I examined the patient myself. I personally reviewed the relevant tests, images, and reports as documented above. I formulated and edited as necessary the assessment and plan and discussed the findings and management plan with the patient and family.  - Sima Carpenter MD

## 2020-01-28 ENCOUNTER — OFFICE VISIT (OUTPATIENT)
Dept: OPHTHALMOLOGY | Facility: CLINIC | Age: 60
End: 2020-01-28
Attending: OPHTHALMOLOGY
Payer: COMMERCIAL

## 2020-01-28 DIAGNOSIS — H40.1123 PRIMARY OPEN ANGLE GLAUCOMA (POAG) OF LEFT EYE, SEVERE STAGE: Primary | ICD-10-CM

## 2020-01-28 DIAGNOSIS — H25.13 NUCLEAR SENILE CATARACT OF BOTH EYES: ICD-10-CM

## 2020-01-28 PROCEDURE — G0463 HOSPITAL OUTPT CLINIC VISIT: HCPCS | Mod: ZF

## 2020-01-28 PROCEDURE — 92083 EXTENDED VISUAL FIELD XM: CPT | Mod: ZF | Performed by: OPHTHALMOLOGY

## 2020-01-28 RX ORDER — LATANOPROST 50 UG/ML
1 SOLUTION/ DROPS OPHTHALMIC AT BEDTIME
Qty: 1 BOTTLE | Refills: 11 | Status: SHIPPED | OUTPATIENT
Start: 2020-01-28

## 2020-01-28 RX ORDER — TIMOLOL MALEATE 5 MG/ML
1 SOLUTION/ DROPS OPHTHALMIC EVERY MORNING
Qty: 1 BOTTLE | Refills: 11 | Status: SHIPPED | OUTPATIENT
Start: 2020-01-28 | End: 2021-11-29

## 2020-01-28 ASSESSMENT — REFRACTION_MANIFEST
OD_SPHERE: PLANO
OD_AXIS: 043
OD_CYLINDER: +1.00
OS_ADD: +1.75
OS_SPHERE: PLANO
OD_ADD: +1.75

## 2020-01-28 ASSESSMENT — TONOMETRY
OS_IOP_MMHG: 15
OD_IOP_MMHG: 13
IOP_METHOD: APPLANATION
OS_IOP_MMHG: 13
IOP_METHOD: APPLANATION
OD_IOP_MMHG: 12

## 2020-01-28 ASSESSMENT — VISUAL ACUITY
METHOD: SNELLEN - LINEAR
CORRECTION_TYPE: GLASSES
OS_SC: 20/40
OD_SC: 20/30

## 2020-01-28 ASSESSMENT — REFRACTION_WEARINGRX
OD_AXIS: 045
OS_SPHERE: +0.50
OD_SPHERE: +1.00
OS_ADD: +1.75
OD_ADD: +1.75
OS_CYLINDER: +1.00
OD_CYLINDER: +0.75
OS_AXIS: 030

## 2020-01-28 ASSESSMENT — EXTERNAL EXAM - LEFT EYE: OS_EXAM: NORMAL

## 2020-01-28 ASSESSMENT — EXTERNAL EXAM - RIGHT EYE: OD_EXAM: NORMAL

## 2020-01-28 ASSESSMENT — SLIT LAMP EXAM - LIDS
COMMENTS: BLEPHARITIS
COMMENTS: BLEPHARITIS

## 2020-01-28 ASSESSMENT — CONF VISUAL FIELD
OD_NORMAL: 1
OS_SUPERIOR_NASAL_RESTRICTION: 3
METHOD: COUNTING FINGERS

## 2020-01-28 NOTE — NURSING NOTE
Chief Complaints and History of Present Illnesses   Patient presents with     Glaucoma Follow-Up     Chief Complaint(s) and History of Present Illness(es)     Glaucoma Follow-Up     Associated symptoms: Negative for floaters and flashes    Compliance with Treatment: always    Pain scale: 0/10              Comments     The patient presents for a Glaucoma follow up.    He has no noticeable vision changes.  He uses Timolol daily in both eyes and Latanoprost at night in the left eye.  Marsha Fink, COA, COA 2:01 PM 01/28/2020

## 2020-01-28 NOTE — PROGRESS NOTES
1)POAG OS>>OD -- started on gtts in 2019 by Dr. William, originally from [facility], etiology of blurred vision OS -- K pachy: 540/550   Tmax: 22/33    HVF: OD:Full and OS:Dense superior arcuate with eaerly IN step      CDR:0.3/0.7     HRT/OCT: OD:RNFL WNL and OS:Mod RNFL thinning with marked prog from 2018->2019     FHX of Glc:Unknown, family is in [place] and did not have good access to care      Gonio: open      Intolerant to:      Asthma/COPD: No  Steroid Use:  No   Kidney Stones: No     Sulfa Allergy:No      IOP targets:OD:Hteens-L20s and OS:?Mteens  2)NS OU   3)H/O Pterygium  s/p excision OD/conj autograft/AMT OD (7/30/2019) --- following with Dr. William   4)DM s     MD:recomend Phaco/Glc surgery prior to pterygium -- do not recommend Monovison or Multifocal IOL    MD:pt started to notice dark area with blurred vision OS in 2019     Patient will continue on Timolol which is a yellow top drop in the morning in BOTH EYES and Latanoprost which is a teal top drop at bedtime in the LEFT EYE ONLY. Patient will return to clinic in 3-4 months with repeat IOP check and visual field test (LEFT EYE ONLY).      This drop may cause lash growth and some darkening of the skin around the eye.  It is also possible in a patient with a freckled iris or lynne eyes, that the iris could darken to brown with time, something that is not common but not reversible.    Attending Physician Attestation:  Complete documentation of historical and exam elements from today's encounter can be found in the full encounter summary report (not reduplicated in this progress note). I personally obtained the chief complaint(s) and history of present illness.  I confirmed and edited as necessary the review of systems, past medical/surgical history, family history, social history, and examination findings as documented by others; and I examined the patient myself. I personally reviewed the relevant tests, images, and reports as documented above. I formulated  and edited as necessary the assessment and plan and discussed the findings and management plan with the patient and family.  - Sima Carpenter MD

## 2020-01-28 NOTE — PATIENT INSTRUCTIONS
Patient will continue on Timolol which is a yellow top drop in the morning in BOTH EYES and Latanoprost which is a teal top drop at bedtime in the LEFT EYE ONLY. Patient will return to clinic in 3-4 months with repeat IOP check and visual field test (LEFT EYE ONLY).      This drop may cause lash growth and some darkening of the skin around the eye.  It is also possible in a patient with a freckled iris or lynne eyes, that the iris could darken to brown with time, something that is not common but not reversible.

## 2020-05-21 ENCOUNTER — TELEPHONE (OUTPATIENT)
Dept: OPHTHALMOLOGY | Facility: CLINIC | Age: 60
End: 2020-05-21

## 2020-06-25 ENCOUNTER — TELEPHONE (OUTPATIENT)
Dept: OPHTHALMOLOGY | Facility: CLINIC | Age: 60
End: 2020-06-25

## 2020-06-25 NOTE — TELEPHONE ENCOUNTER
ADAMAM asking patient to call back and re-schedule appointment that was supposed to be 06/26 with Dr. Carpenter.

## 2020-07-27 ENCOUNTER — OFFICE VISIT (OUTPATIENT)
Dept: OPHTHALMOLOGY | Facility: CLINIC | Age: 60
End: 2020-07-27
Attending: OPHTHALMOLOGY
Payer: COMMERCIAL

## 2020-07-27 DIAGNOSIS — H40.1123 PRIMARY OPEN ANGLE GLAUCOMA (POAG) OF LEFT EYE, SEVERE STAGE: Primary | ICD-10-CM

## 2020-07-27 DIAGNOSIS — H25.13 NUCLEAR SENILE CATARACT OF BOTH EYES: ICD-10-CM

## 2020-07-27 PROCEDURE — 92083 EXTENDED VISUAL FIELD XM: CPT | Mod: ZF | Performed by: OPHTHALMOLOGY

## 2020-07-27 PROCEDURE — G0463 HOSPITAL OUTPT CLINIC VISIT: HCPCS | Mod: ZF

## 2020-07-27 RX ORDER — DORZOLAMIDE HYDROCHLORIDE AND TIMOLOL MALEATE 20; 5 MG/ML; MG/ML
1 SOLUTION/ DROPS OPHTHALMIC 2 TIMES DAILY
Qty: 1 BOTTLE | Refills: 11 | Status: SHIPPED | OUTPATIENT
Start: 2020-07-27 | End: 2021-08-05

## 2020-07-27 ASSESSMENT — TONOMETRY
IOP_METHOD: APPLANATION
OS_IOP_MMHG: 27
OD_IOP_MMHG: 20
OS_IOP_MMHG: 33
OD_IOP_MMHG: 20
IOP_METHOD: TONOPEN
OD_IOP_MMHG: 18
OD_IOP_MMHG: 12
OS_IOP_MMHG: 35
OS_IOP_MMHG: 28
IOP_METHOD: APPLANATION
IOP_METHOD: TONOPEN

## 2020-07-27 ASSESSMENT — VISUAL ACUITY
OD_SC: 20/40
OS_SC+: -2
OD_PH_SC: 20/20
OS_SC: 20/40
METHOD: SNELLEN - LINEAR
OD_PH_SC+: -1
OD_SC+: +2

## 2020-07-27 ASSESSMENT — CONF VISUAL FIELD
METHOD: COUNTING FINGERS
OD_NORMAL: 1
OS_SUPERIOR_NASAL_RESTRICTION: 3

## 2020-07-27 ASSESSMENT — SLIT LAMP EXAM - LIDS
COMMENTS: BLEPHARITIS
COMMENTS: BLEPHARITIS

## 2020-07-27 ASSESSMENT — EXTERNAL EXAM - LEFT EYE: OS_EXAM: NORMAL

## 2020-07-27 ASSESSMENT — EXTERNAL EXAM - RIGHT EYE: OD_EXAM: NORMAL

## 2020-07-27 NOTE — PROGRESS NOTES
1)POAG OS>>OD -- started on gtts in 2019 by Dr. William, originally from [facility], etiology of blurred vision OS -- K pachy: 540/550   Tmax: 22/33    HVF: OD:Full and OS:Dense superior hemifield affecting central fixationwith early IN step      CDR:0.3/0.7     HRT/OCT: OD:RNFL WNL and OS:Mod RNFL thinning with marked prog from 2018->2019     FHX of Glc:Unknown, family is in [place] and did not have good access to care      Gonio: open      Intolerant to:      Asthma/COPD: No  Steroid Use:  No   Kidney Stones: No     Sulfa Allergy:No      IOP targets:OD:Hteens-L20s and OS:?Mteens  2)NS OU   3)H/O Pterygium  s/p excision OD/conj autograft/AMT OD (7/30/2019) --- following with Dr. William   4)DM s     MD:recomend Phaco/Glc surgery prior to pterygium -- do not recommend Monovison or Multifocal IOL    MD:pt started to notice dark area with blurred vision OS in 2019     Patient will discontinue and hold onto the Timolol which is a yellow top drop.  Patient will continue on Latanoprost which is a teal top drop at bedtime in BOTH EYES.  A long discussion of the risks, benefits, and alternatives including potential treatment and management options were had with patient and a decision was made to start Cosopt (Timolol/Dorzolamide) which is a blue top drop 2x/day (12 hours apart) in the LEFT EYE ONLY and return to clinic in 3-4 weeks with repeat IOP check.             Resident Note (for education purposes only; see above for A/P):  POAG OS>>OD -- started on gtts in 2019 by Dr. William  Here for 5 month follow up, VF left eye only  Endorses good compliance (misses one dose every week to every couple weeks, took meds last night and this morning, no trouble affording meds)  IOP 12/27 today  HVF 24-2 today stable from 2018 with dense superior arc nearing central fixation  Given stability of visual field, reasonable to try increasing medical therapy left eye prior to incisional surgery  Switch timolol to cosopt both eyes, continue latanoprost  at bedtime left eye only  Begin alphagan bid left eye only  RTC in 6 weeks for repeat IOP check; consider phaco/tube v. Primary tube if still above goal    Bayron Clemens, PGY3  Ophthalmology Resident      Attending Physician Attestation:  Complete documentation of historical and exam elements from today's encounter can be found in the full encounter summary report (not reduplicated in this progress note). I personally obtained the chief complaint(s) and history of present illness.  I confirmed and edited as necessary the review of systems, past medical/surgical history, family history, social history, and examination findings as documented by others; and I examined the patient myself. I personally reviewed the relevant tests, images, and reports as documented above. I formulated and edited as necessary the assessment and plan and discussed the findings and management plan with the patient and family.  - Sima Carpenter MD

## 2020-07-27 NOTE — NURSING NOTE
Chief Complaints and History of Present Illnesses   Patient presents with     Glaucoma Follow-Up     6 month follow up POAG, both eyes     Chief Complaint(s) and History of Present Illness(es)     Glaucoma Follow-Up     Laterality: both eyes    Quality: blurred    Associated symptoms: Negative for eye pain, dryness, tearing and discharge    Treatment side effects: none    Compliance with Treatment: always    Pain scale: 0/10    Comments: 6 month follow up POAG, both eyes              Comments     Pt denies any significant vision changes in either eye since last visit.  Denies any pain, pressure, irritation, discharge, and tearing.  Ocular Meds: Timolol qam BE & Latanoprost at bedtime ALCIDES Castellanos OT 8:05 AM July 27, 2020

## 2020-07-27 NOTE — PATIENT INSTRUCTIONS
Patient will discontinue and hold onto the Timolol which is a yellow top drop.  Patient will continue on Latanoprost which is a teal top drop at bedtime in BOTH EYES.  A long discussion of the risks, benefits, and alternatives including potential treatment and management options were had with patient and a decision was made to start Cosopt (Timolol/Dorzolamide) which is a blue top drop 2x/day (12 hours apart) in the LEFT EYE ONLY and return to clinic in 3-4 weeks with repeat IOP check.

## 2021-08-05 DIAGNOSIS — H40.1123 PRIMARY OPEN ANGLE GLAUCOMA (POAG) OF LEFT EYE, SEVERE STAGE: ICD-10-CM

## 2021-08-05 RX ORDER — DORZOLAMIDE HYDROCHLORIDE AND TIMOLOL MALEATE 20; 5 MG/ML; MG/ML
1 SOLUTION/ DROPS OPHTHALMIC 2 TIMES DAILY
Qty: 10 ML | Refills: 11 | Status: SHIPPED
Start: 2021-08-05

## 2021-08-05 NOTE — TELEPHONE ENCOUNTER
Rx denied today    Spoke to pt at 0950    Pt states seeing Dr. Carpenter at new location and did receive his eye drops already    Avinash Quach RN 9:51 AM 08/05/21

## 2021-08-05 NOTE — TELEPHONE ENCOUNTER
"   dorzolamide-timolol (COSOPT) 2-0.5 % ophthalmic solution  Last Written Prescription Date:  7/27/20  Last Fill Quantity: 1 bottle,   # refills: 11  Last Office Visit :7/27/20  Future Office visit: none    \" start Cosopt (Timolol/Dorzolamide) which is a blue top drop 2x/day (12 hours apart) in the LEFT EYE ONLY and return to clinic in 3-4 weeks with repeat IOP check\"    \"  RTC in 6 weeks\"     Routing refill request to provider for review/approval because: past due for 3-4 wk appt. Pauline gone... rf?    "

## 2021-11-29 ENCOUNTER — OFFICE VISIT (OUTPATIENT)
Dept: OPHTHALMOLOGY | Facility: CLINIC | Age: 61
End: 2021-11-29
Attending: OPHTHALMOLOGY
Payer: COMMERCIAL

## 2021-11-29 DIAGNOSIS — H40.1123 PRIMARY OPEN ANGLE GLAUCOMA (POAG) OF LEFT EYE, SEVERE STAGE: Primary | ICD-10-CM

## 2021-11-29 DIAGNOSIS — H11.052 PROGRESSIVE PERIPHERAL PTERYGIUM OF LEFT EYE: ICD-10-CM

## 2021-11-29 PROCEDURE — G0463 HOSPITAL OUTPT CLINIC VISIT: HCPCS

## 2021-11-29 PROCEDURE — 99215 OFFICE O/P EST HI 40 MIN: CPT | Mod: GC | Performed by: OPHTHALMOLOGY

## 2021-11-29 ASSESSMENT — TONOMETRY
OD_IOP_MMHG: 11
IOP_METHOD: ICARE
OS_IOP_MMHG: 11

## 2021-11-29 ASSESSMENT — VISUAL ACUITY
OS_SC+: -2
OD_SC: 20/25
OS_SC: 20/40
OD_SC+: -2
METHOD: SNELLEN - LINEAR

## 2021-11-29 ASSESSMENT — REFRACTION_WEARINGRX
OS_ADD: +1.75
OD_AXIS: 045
OS_SPHERE: +0.50
OD_SPHERE: +1.00
OD_ADD: +1.75
OD_CYLINDER: +0.75
OS_AXIS: 030
OS_CYLINDER: +1.00

## 2021-11-29 ASSESSMENT — EXTERNAL EXAM - LEFT EYE: OS_EXAM: NORMAL

## 2021-11-29 ASSESSMENT — SLIT LAMP EXAM - LIDS
COMMENTS: BLEPHARITIS
COMMENTS: BLEPHARITIS

## 2021-11-29 ASSESSMENT — CONF VISUAL FIELD
OD_NORMAL: 1
OS_SUPERIOR_NASAL_RESTRICTION: 3

## 2021-11-29 ASSESSMENT — EXTERNAL EXAM - RIGHT EYE: OD_EXAM: NORMAL

## 2021-11-29 NOTE — NURSING NOTE
Chief Complaints and History of Present Illnesses   Patient presents with     Follow Up     2 year follow up  Pterygia OD > OS s/p excision OD/conj autograft/AMT right eye (7/30/2019)     Chief Complaint(s) and History of Present Illness(es)     Follow Up     Comments: 2 year follow up  Pterygia OD > OS s/p excision OD/conj autograft/AMT right eye (7/30/2019)              Comments     Pt states vision has been good since cataract surgery in both eyes. Pt s/p CE/IOL LE about 2-3 months ago.  No eye pain today. No new flashes or floaters.  No Redness or dryness.    DM2 BS: Pt doesn't check blood sugars.  No results found for: A1C    ABBI Sarmiento November 29, 2021 7:21 AM

## 2021-11-29 NOTE — PROGRESS NOTES
CC: referred by Dr. Bassett for pterygium OU    HPI: 62yo  M, smoker, reports 30 year history of white spots at nasal cornea both eyes.  He grew up in [place] and was told many years ago to leave them until they affected vision. Now s/p pterygium excision OD.    Interval Hx: Pt with history of bilateral pterygium s/p pterygium excision with conjunctival autograft right eye in July 2019 here for follow up after no visit for 2+ years.  He has been seeing Dr. Carpenter for glaucoma, who performed both tube and CEIOL surgery in about 8/2021.  He denies pain, redness, discharge, flashes, or floaters. No diplopia. Vision is a bit blurry OS.    OcHx:  Pterygium s/p excision/conj autograft/AMT OD (7/30/19)  CEIOL left eye (~8/2021)  GDI left eye (~8/2021)    Gtts:   Cosopt BID left eye  Latanoprost at bedtime right eye    1. Pterygium OD  s/p excision OD/conj autograft/AMT right eye   - pterygium OS - recommend excision with conjunctival autograft/AMT OS  - risk discussed including pain, bleeding, loss of vision, loss of the eye, recurrence of pterygium, scleral perforation, need for repeat surgery.  - continue drops as above    2. DMII  - Last DFE was Dr. Carpenter about Sept 2021.    3. Cataract right eye   - Borderline    4. Blepharitis  - Lid hygiene    5. Glaucoma - continue to follow with Dr. Carpenter.   - s/t Tube left eye    - IOP wnl 11/29/21    Samir Crespo,   Fellow, Cornea & External Disease  Department of Ophthalmology  [facility]    Attending Physician Attestation:  Complete documentation of historical and exam elements from today's encounter can be found in the full encounter summary report (not reduplicated in this progress note).  I personally obtained the chief complaint(s) and history of present illness.  I confirmed and edited as necessary the review of systems, past medical/surgical history, family history, social history, and examination findings as documented by others; and I examined  the patient myself.  I personally reviewed the relevant tests, images, and reports as documented above.  I formulated and edited as necessary the assessment and plan and discussed the findings and management plan with the patient and family. - Guru William MD    I personally spent great than 40min with the patient, of which >50% of the time was spent face to face with the patient, counseling and coordinating care with the patient. We discussed the complexity of his diagnosis, the need for further information prior to proceeding with yet another surgery, and the unknown prognosis for the patient at this time.    Guru William MD

## 2022-01-10 ENCOUNTER — TELEPHONE (OUTPATIENT)
Dept: OPHTHALMOLOGY | Facility: CLINIC | Age: 62
End: 2022-01-10
Payer: COMMERCIAL

## 2022-01-10 ENCOUNTER — HOSPITAL ENCOUNTER (OUTPATIENT)
Facility: AMBULATORY SURGERY CENTER | Age: 62
End: 2022-01-10
Attending: OPHTHALMOLOGY
Payer: COMMERCIAL

## 2022-01-10 DIAGNOSIS — H11.052 PROGRESSIVE PERIPHERAL PTERYGIUM OF LEFT EYE: ICD-10-CM

## 2022-01-10 NOTE — TELEPHONE ENCOUNTER
I called patient to schedule surgery with Dr. Guru William, I left a voicemail with callback # [phone]

## 2022-01-10 NOTE — TELEPHONE ENCOUNTER
Patient is scheduled for surgery with Dr. Guru William     Spoke with: Felix     Date of Surgery: 03/01     Location: [facility]:  [address] [place] [address]     Informed patient they will need an adult : Yes     H&P will be completed at: [facility]     COVID testing: [facility]    Post Op scheduled on 03/02, 03/09, 04/06, and 05/04      Surgery packet was mailed 01/10     Additional comments: Advised RN will call 1 - 2 business days prior with arrival time and instructions.

## 2022-02-08 DIAGNOSIS — Z11.59 ENCOUNTER FOR SCREENING FOR OTHER VIRAL DISEASES: Primary | ICD-10-CM

## 2022-02-21 DIAGNOSIS — H11.052 PROGRESSIVE PERIPHERAL PTERYGIUM OF LEFT EYE: Primary | ICD-10-CM

## 2022-02-21 RX ORDER — MOXIFLOXACIN 5 MG/ML
1 SOLUTION/ DROPS OPHTHALMIC
Status: CANCELLED | OUTPATIENT
Start: 2022-02-21

## 2022-02-21 RX ORDER — PREDNISOLONE ACETATE 10 MG/ML
1 SUSPENSION/ DROPS OPHTHALMIC 4 TIMES DAILY
Qty: 10 ML | Refills: 1 | Status: SHIPPED | OUTPATIENT
Start: 2022-02-21

## 2022-02-21 RX ORDER — PROPARACAINE HYDROCHLORIDE 5 MG/ML
1 SOLUTION/ DROPS OPHTHALMIC ONCE
Status: CANCELLED | OUTPATIENT
Start: 2022-02-21 | End: 2022-02-21

## 2022-02-21 RX ORDER — OFLOXACIN 3 MG/ML
1 SOLUTION/ DROPS OPHTHALMIC 4 TIMES DAILY
Qty: 10 ML | Refills: 1 | Status: SHIPPED | OUTPATIENT
Start: 2022-02-21

## 2022-02-28 ENCOUNTER — TELEPHONE (OUTPATIENT)
Dept: OPHTHALMOLOGY | Facility: CLINIC | Age: 62
End: 2022-02-28
Payer: COMMERCIAL

## 2022-02-28 RX ORDER — ACETAMINOPHEN 325 MG/1
975 TABLET ORAL ONCE
Status: CANCELLED | OUTPATIENT
Start: 2022-02-28 | End: 2022-02-28

## 2022-02-28 RX ORDER — ONDANSETRON 4 MG/1
4 TABLET, ORALLY DISINTEGRATING ORAL EVERY 30 MIN PRN
Status: CANCELLED | OUTPATIENT
Start: 2022-02-28

## 2022-02-28 RX ORDER — OXYCODONE HYDROCHLORIDE 5 MG/1
5 TABLET ORAL EVERY 4 HOURS PRN
Status: CANCELLED | OUTPATIENT
Start: 2022-02-28

## 2022-02-28 RX ORDER — HYDROMORPHONE HYDROCHLORIDE 1 MG/ML
0.2 INJECTION, SOLUTION INTRAMUSCULAR; INTRAVENOUS; SUBCUTANEOUS EVERY 5 MIN PRN
Status: CANCELLED | OUTPATIENT
Start: 2022-02-28

## 2022-02-28 RX ORDER — LIDOCAINE 40 MG/G
CREAM TOPICAL
Status: CANCELLED | OUTPATIENT
Start: 2022-02-28

## 2022-02-28 RX ORDER — SODIUM CHLORIDE, SODIUM LACTATE, POTASSIUM CHLORIDE, CALCIUM CHLORIDE 600; 310; 30; 20 MG/100ML; MG/100ML; MG/100ML; MG/100ML
INJECTION, SOLUTION INTRAVENOUS CONTINUOUS
Status: CANCELLED | OUTPATIENT
Start: 2022-02-28

## 2022-02-28 RX ORDER — MEPERIDINE HYDROCHLORIDE 25 MG/ML
12.5 INJECTION INTRAMUSCULAR; INTRAVENOUS; SUBCUTANEOUS
Status: CANCELLED | OUTPATIENT
Start: 2022-02-28

## 2022-02-28 RX ORDER — FENTANYL CITRATE 50 UG/ML
25 INJECTION, SOLUTION INTRAMUSCULAR; INTRAVENOUS
Status: CANCELLED | OUTPATIENT
Start: 2022-02-28

## 2022-02-28 RX ORDER — FENTANYL CITRATE 50 UG/ML
25 INJECTION, SOLUTION INTRAMUSCULAR; INTRAVENOUS EVERY 5 MIN PRN
Status: CANCELLED | OUTPATIENT
Start: 2022-02-28

## 2022-02-28 RX ORDER — ONDANSETRON 2 MG/ML
4 INJECTION INTRAMUSCULAR; INTRAVENOUS EVERY 30 MIN PRN
Status: CANCELLED | OUTPATIENT
Start: 2022-02-28

## 2022-02-28 NOTE — TELEPHONE ENCOUNTER
Returned Felix's voicemail and confirmed he wants to cancel his surgery 03/01 due to severe back pain. Felix says he is not sure when he can reschedule but will see Dr. William again before he is ready to reschedule. I let [place] know I will cancel his surgery for tomorrow and his post-op appointments.

## (undated) DEVICE — EYE SHIELD PLASTIC

## (undated) DEVICE — GLOVE PROTEXIS MICRO 6.5  2D73PM65

## (undated) DEVICE — EYE LENS BNDG CONTACT PRECISION SPHERE III KONTUR 9.0X18MM

## (undated) DEVICE — BLADE KNIFE BEAVER MINI STR BEAVER6900

## (undated) DEVICE — GLOVE PROTEXIS MICRO 6.0  2D73PM60

## (undated) DEVICE — EYE CANN IRR 27GA ANTERIOR CHAMBER 581280

## (undated) DEVICE — LINEN TOWEL PACK X5 5464

## (undated) DEVICE — PACK CATARACT CUSTOM ASC SEY15CPUMC

## (undated) DEVICE — DRAPE STERI APERATURE 51X33" 1030

## (undated) DEVICE — EYE KNIFE CRESCENT 55DEG 373807

## (undated) RX ORDER — ACETAMINOPHEN 325 MG/1
TABLET ORAL
Status: DISPENSED
Start: 2019-07-30

## (undated) RX ORDER — PROPOFOL 10 MG/ML
INJECTION, EMULSION INTRAVENOUS
Status: DISPENSED
Start: 2019-07-30

## (undated) RX ORDER — LIDOCAINE HYDROCHLORIDE 20 MG/ML
INJECTION, SOLUTION EPIDURAL; INFILTRATION; INTRACAUDAL; PERINEURAL
Status: DISPENSED
Start: 2019-07-30

## (undated) RX ORDER — LIDOCAINE HYDROCHLORIDE AND EPINEPHRINE 10; 10 MG/ML; UG/ML
INJECTION, SOLUTION INFILTRATION; PERINEURAL
Status: DISPENSED
Start: 2019-07-30